# Patient Record
Sex: MALE | Race: WHITE | NOT HISPANIC OR LATINO | Employment: FULL TIME | URBAN - METROPOLITAN AREA
[De-identification: names, ages, dates, MRNs, and addresses within clinical notes are randomized per-mention and may not be internally consistent; named-entity substitution may affect disease eponyms.]

---

## 2017-08-01 ENCOUNTER — TELEPHONE (OUTPATIENT)
Dept: FAMILY MEDICINE CLINIC | Facility: CLINIC | Age: 52
End: 2017-08-01

## 2017-08-01 RX ORDER — OMEPRAZOLE 40 MG/1
40 CAPSULE, DELAYED RELEASE ORAL DAILY
Qty: 30 CAPSULE | Refills: 0 | Status: SHIPPED | OUTPATIENT
Start: 2017-08-01 | End: 2018-09-21 | Stop reason: SDUPTHER

## 2017-08-01 NOTE — TELEPHONE ENCOUNTER
----- Message from Katrin Knutson MA sent at 7/31/2017  5:27 PM EDT -----  Regarding: refill  Contact: [phone]  Needs refill on omeprazole dr 40 mg cap send to [address].

## 2017-09-07 ENCOUNTER — APPOINTMENT (OUTPATIENT)
Dept: GENERAL RADIOLOGY | Facility: HOSPITAL | Age: 52
End: 2017-09-07

## 2017-09-07 ENCOUNTER — HOSPITAL ENCOUNTER (EMERGENCY)
Facility: HOSPITAL | Age: 52
Discharge: HOME | End: 2017-09-07
Attending: EMERGENCY MEDICINE | Admitting: EMERGENCY MEDICINE

## 2017-09-07 VITALS
DIASTOLIC BLOOD PRESSURE: 89 MMHG | TEMPERATURE: 98.4 F | HEART RATE: 62 BPM | RESPIRATION RATE: 18 BRPM | BODY MASS INDEX: 24.73 KG/M2 | HEIGHT: 69 IN | OXYGEN SATURATION: 95 % | SYSTOLIC BLOOD PRESSURE: 120 MMHG | WEIGHT: 167 LBS

## 2017-09-07 DIAGNOSIS — R29.810 FACIAL DROOP: ICD-10-CM

## 2017-09-07 DIAGNOSIS — R20.2 FACIAL PARESTHESIA: ICD-10-CM

## 2017-09-07 DIAGNOSIS — G51.0 BELL'S PALSY: Primary | ICD-10-CM

## 2017-09-07 LAB
ALT SERPL W P-5'-P-CCNC: 23 U/L (ref 7–40)
APTT PPP: 26.8 SECONDS (ref 24–31)
AST SERPL-CCNC: 22 U/L (ref 0–33)
BASOPHILS # BLD AUTO: 0.03 10*3/MM3 (ref 0–0.2)
BASOPHILS NFR BLD AUTO: 0.4 % (ref 0–1)
BUN BLDA-MCNC: 12 MG/DL (ref 8–26)
CA-I BLDA-SCNC: 1.24 MMOL/L (ref 1.2–1.32)
CHLORIDE BLDA-SCNC: 104 MMOL/L (ref 98–109)
CO2 BLDA-SCNC: 26 MMOL/L (ref 24–29)
CREAT BLDA-MCNC: 0.8 MG/DL (ref 0.6–1.3)
DEPRECATED RDW RBC AUTO: 43.5 FL (ref 37–54)
EOSINOPHIL # BLD AUTO: 0.09 10*3/MM3 (ref 0–0.3)
EOSINOPHIL NFR BLD AUTO: 1.2 % (ref 0–3)
ERYTHROCYTE [DISTWIDTH] IN BLOOD BY AUTOMATED COUNT: 12.9 % (ref 11.3–14.5)
GLUCOSE BLDC GLUCOMTR-MCNC: 82 MG/DL (ref 70–130)
HCT VFR BLD AUTO: 46.8 % (ref 38.9–50.9)
HCT VFR BLDA CALC: 49 % (ref 38–51)
HGB BLD-MCNC: 16.1 G/DL (ref 13.1–17.5)
HGB BLDA-MCNC: 16.7 G/DL (ref 12–17)
HOLD SPECIMEN: NORMAL
HOLD SPECIMEN: NORMAL
IMM GRANULOCYTES # BLD: 0.02 10*3/MM3 (ref 0–0.03)
IMM GRANULOCYTES NFR BLD: 0.3 % (ref 0–0.6)
INR PPP: 1 (ref 0.8–1.2)
LYMPHOCYTES # BLD AUTO: 1.99 10*3/MM3 (ref 0.6–4.8)
LYMPHOCYTES NFR BLD AUTO: 26 % (ref 24–44)
MCH RBC QN AUTO: 31.6 PG (ref 27–31)
MCHC RBC AUTO-ENTMCNC: 34.4 G/DL (ref 32–36)
MCV RBC AUTO: 91.8 FL (ref 80–99)
MONOCYTES # BLD AUTO: 0.44 10*3/MM3 (ref 0–1)
MONOCYTES NFR BLD AUTO: 5.8 % (ref 0–12)
NEUTROPHILS # BLD AUTO: 5.08 10*3/MM3 (ref 1.5–8.3)
NEUTROPHILS NFR BLD AUTO: 66.3 % (ref 41–71)
PLATELET # BLD AUTO: 244 10*3/MM3 (ref 150–450)
PMV BLD AUTO: 13.1 FL (ref 6–12)
POTASSIUM BLDA-SCNC: 3.7 MMOL/L (ref 3.5–4.9)
PROTHROMBIN TIME: 11.8 SECONDS (ref 12.8–15.2)
RBC # BLD AUTO: 5.1 10*6/MM3 (ref 4.2–5.76)
SODIUM BLDA-SCNC: 143 MMOL/L (ref 138–146)
TROPONIN I SERPL-MCNC: 0 NG/ML (ref 0–0.07)
WBC NRBC COR # BLD: 7.65 10*3/MM3 (ref 3.5–10.8)
WHOLE BLOOD HOLD SPECIMEN: NORMAL
WHOLE BLOOD HOLD SPECIMEN: NORMAL

## 2017-09-07 PROCEDURE — 85014 HEMATOCRIT: CPT

## 2017-09-07 PROCEDURE — 80047 BASIC METABLC PNL IONIZED CA: CPT

## 2017-09-07 PROCEDURE — 84460 ALANINE AMINO (ALT) (SGPT): CPT | Performed by: EMERGENCY MEDICINE

## 2017-09-07 PROCEDURE — 99284 EMERGENCY DEPT VISIT MOD MDM: CPT

## 2017-09-07 PROCEDURE — 71010 HC CHEST PA OR AP: CPT

## 2017-09-07 PROCEDURE — 84450 TRANSFERASE (AST) (SGOT): CPT | Performed by: EMERGENCY MEDICINE

## 2017-09-07 PROCEDURE — 85610 PROTHROMBIN TIME: CPT

## 2017-09-07 PROCEDURE — 93005 ELECTROCARDIOGRAM TRACING: CPT | Performed by: EMERGENCY MEDICINE

## 2017-09-07 PROCEDURE — 84484 ASSAY OF TROPONIN QUANT: CPT

## 2017-09-07 PROCEDURE — 85730 THROMBOPLASTIN TIME PARTIAL: CPT | Performed by: EMERGENCY MEDICINE

## 2017-09-07 PROCEDURE — 85025 COMPLETE CBC W/AUTO DIFF WBC: CPT | Performed by: EMERGENCY MEDICINE

## 2017-09-07 RX ORDER — SODIUM CHLORIDE 0.9 % (FLUSH) 0.9 %
10 SYRINGE (ML) INJECTION AS NEEDED
Status: DISCONTINUED | OUTPATIENT
Start: 2017-09-07 | End: 2017-09-07 | Stop reason: HOSPADM

## 2017-09-07 RX ORDER — ACYCLOVIR 400 MG/1
400 TABLET ORAL
Qty: 35 TABLET | Refills: 0 | Status: SHIPPED | OUTPATIENT
Start: 2017-09-07 | End: 2017-09-14

## 2017-09-07 RX ORDER — PREDNISONE 20 MG/1
TABLET ORAL
Qty: 20 TABLET | Refills: 0 | Status: SHIPPED | OUTPATIENT
Start: 2017-09-07 | End: 2017-10-02

## 2017-09-07 NOTE — DISCHARGE INSTRUCTIONS
Follow up with Dr. Hobbs on Monday. Call for appointment.  Use Lacri-Lube on the right eye as needed, over-the-counter.    Follow up with Dr. Man, ENT early next week. Call for appointment.     Immediately return to the ED for any concerns or worsening symptoms.     You may return to work on 9/12/17 if you are cleared by Dr. Hobbs during your appointment on Monday.     Please review the medications you are supposed to be taking according to prior physician recommendations. I have not changed your home medications during this visit. If your discharge instructions indicate that I have changed your home medications, this is not the case, and you should disregard. If you have any questions about the medication you should be taking at home, please call your physician.

## 2017-09-07 NOTE — ED PROVIDER NOTES
Subjective   History of Present Illness    Review of Systems    Past Medical History:   Diagnosis Date   • Anxiety    • Chronic reflux esophagitis    • Diverticulosis    • Hiatal hernia    • Nicotine dependence        No Known Allergies    Past Surgical History:   Procedure Laterality Date   • INGUINAL HERNIA REPAIR     • LAPAROSCOPY ESOPHAGOGASTRIC FUNDOPLASTY HYBRID     • NEUROPLASTY / TRANSPOSITION ULNAR NERVE AT ELBOW     • UPPER GASTROINTESTINAL ENDOSCOPY         Family History   Problem Relation Age of Onset   • Diabetes Mother    • Cancer Father      lung       Social History     Social History   • Marital status:      Spouse name: N/A   • Number of children: N/A   • Years of education: N/A     Social History Main Topics   • Smoking status: Current Every Day Smoker   • Smokeless tobacco: Not on file   • Alcohol use Not on file   • Drug use: Not on file   • Sexual activity: Not on file     Other Topics Concern   • Not on file     Social History Narrative           Objective   Physical Exam    Procedures         ED Course  ED Course   Comment By Time   I was called to the patient's bedside to evaluate the patient.  I have done so.  I may end up seeing the patient from a primary standpoint, but that remains to be seen.  I do not believe the patient requires code 19 status, as the symptoms started 17+ hours ago.  My current differential diagnosis has Bell's palsy high on the list. Terence Ramirez MD 09/07 1020   I had a very long and detailed conversation with the patient and his wife about the expected course of care and the results of his studies.  The patient's PTT, liver enzymes, point of care and chem 8, and CBC are unremarkable.  The patient has right-sided facial numbness, loss of taste on the right side of his tongue, difficulty closing his right eyelid, and loss of motor use of the right forehead.  After multiple questions in a relatively long history of present illness process, the patient  also has noted that he's been having some inner ear fullness in the right ear over the last couple days as well.  The rest of his neurologic exam is completely unremarkable.  I believe Bell's palsy to be a very likely diagnosis.  I talked to the patient about the treatment, the chance that the symptoms may continue, and the potential need for ENT follow-up.  We have referred him to her ENT physician, Dr. Man.  We have also referred him to PCP.  I like him to follow up with his primary care physician tomorrow or Monday and with ENT early next week as well.  We have talked about the potential need for intra-articular steroids or even nerve unroofing, should symptoms continue.  I talked about the risks and benefits of not doing a CT scan or MRI.  Patient would like to forego these studies.  If symptoms get worse, he will come back.  Patient is very appreciative for care without question or complaint.  Patient will be diagnosed with Bell's palsy, right-sided facial paresthesia, right-sided facial weakness.  Follow up with PCP and ENT.  Medications as prescribed.  Return here for new or changing concerns. Terence Ramirez MD 09/07 1124                  [facility]    Final diagnoses:   Bell's palsy   Facial paresthesia

## 2017-09-07 NOTE — ED PROVIDER NOTES
Subjective   HPI Comments: This patient is a very nice 52-year-old gentleman who works at the [employer] here as a .  The patient states that yesterday about 5:00, he lost taste in the right side of his tongue and had sudden onset of facial droop and difficulty closing his right eyelid.  He also had numbness throughout the right side of his face.  He tells me that over the last couple of days, he has noticed a fullness in the right ear.  He has no history of Bell's palsy no history of stroke.  His mom, incidentally, has a history of Bell's palsy.  Patient reports that the symptoms started yesterday at 5 PM.  He drove himself to work today but continued to have numbness and decided to come in to get checked out.  I was called to the bedside to determine potential for code 19 status.  Getting a history, Bell's palsy see much more likely based on symptoms that.  Patient does not have hyperacusis but does have the aforementioned fullness in the ear.  He is also had some mild dizziness.  Coincidentally, the patient had some congestion and viral-like syndrome through the weekend and early this week.  He is accompanied here by his wife who confirms the aforementioned story.  In summary, this is a 52-year-old gentleman with sudden onset of difficulty closing the right eyelid, loss of taste right side of his tongue last night and numbness about the right face who comes in today for evaluation.  He denies chest pain, shortness of breath, cough, fevers, chills, neck pain or any other concerns.    Past Medical History: GERD, Diverticulosis. No history of CVA.    Past Family History: Mother has history of Bell's palsy. No history of CVA.     Patient is a 52 y.o. male presenting with general illness.   History provided by:  Patient  Illness   Location:  Right facial   Quality:  Droop and numbness  Severity:  Moderate  Onset quality:  Sudden  Duration:  17 hours  Timing:  Constant  Progression:  Unchanged  Chronicity:   New  Context:  Reports experiencing allergy symptoms last week. He suddenly developed right facial droop with numbness approximately 17 hours ago. Loss of taste and ear pain associated.   Relieved by:  None tried  Worsened by:  Nothing  Ineffective treatments:  None tried  Associated symptoms: congestion (recent congestion last week. ) and ear pain (inner ear fullness sensation )    Associated symptoms: no chest pain, no diarrhea, no fatigue, no fever, no headaches, no myalgias, no nausea, no shortness of breath and no vomiting    Associated symptoms comment:  Recent allergy symptoms.       Review of Systems   Constitutional: Negative for chills, fatigue, fever and unexpected weight change.   HENT: Positive for congestion (recent congestion last week. ) and ear pain (inner ear fullness sensation ). Negative for dental problem, hearing loss and sinus pressure.         Loss of taste at the right tongue.   Eyes: Negative for pain and visual disturbance.        Difficulty closing right eye.    Respiratory: Negative for chest tightness and shortness of breath.    Cardiovascular: Negative for chest pain, palpitations and leg swelling.   Gastrointestinal: Negative for diarrhea, nausea, rectal pain and vomiting.   Genitourinary: Negative for difficulty urinating, dysuria, frequency, hematuria and urgency.   Musculoskeletal: Negative for myalgias, neck pain and neck stiffness.   Neurological: Positive for facial asymmetry (right sided), weakness (Right facial weakness) and numbness (right facial). Negative for seizures, syncope, speech difficulty, light-headedness and headaches.   Psychiatric/Behavioral: Negative for confusion.   All other systems reviewed and are negative.      Past Medical History:   Diagnosis Date   • Anxiety    • Chronic reflux esophagitis    • Diverticulosis    • Hiatal hernia    • Nicotine dependence        No Known Allergies    Past Surgical History:   Procedure Laterality Date   • INGUINAL HERNIA  REPAIR     • LAPAROSCOPY ESOPHAGOGASTRIC FUNDOPLASTY HYBRID     • NEUROPLASTY / TRANSPOSITION ULNAR NERVE AT ELBOW     • UPPER GASTROINTESTINAL ENDOSCOPY         Family History   Problem Relation Age of Onset   • Diabetes Mother    • Cancer Father      lung       Social History     Social History   • Marital status:      Spouse name: N/A   • Number of children: N/A   • Years of education: N/A     Social History Main Topics   • Smoking status: Current Every Day Smoker   • Smokeless tobacco: None   • Alcohol use None   • Drug use: None   • Sexual activity: Not Asked     Other Topics Concern   • None     Social History Narrative    Lives with his wife         Objective   Physical Exam   Constitutional: He is oriented to person, place, and time. He appears well-developed.  Non-toxic appearance. No distress.   HENT:   Right Ear: Tympanic membrane, external ear and ear canal normal.   Left Ear: Tympanic membrane, external ear and ear canal normal.   Nose: Nose normal. No nasal septal hematoma.   Mouth/Throat: Oropharynx is clear and moist. Mucous membranes are not dry. No oral lesions. No trismus in the jaw. No dental abscesses or uvula swelling. No posterior oropharyngeal erythema or tonsillar abscesses. No tonsillar exudate.   Right facial droop.    Eyes: EOM are normal. Pupils are equal, round, and reactive to light. Right conjunctiva is not injected. Left conjunctiva is not injected.   Neck: Normal range of motion. Neck supple. No JVD present. No tracheal tenderness present. No rigidity. Normal range of motion present.   Cardiovascular: Normal rate, regular rhythm and normal heart sounds.  Exam reveals no gallop and no friction rub.    Pulmonary/Chest: Effort normal and breath sounds normal. He has no wheezes. He has no rales. He exhibits no tenderness.   Abdominal: Soft. Bowel sounds are normal. He exhibits no distension, no pulsatile midline mass and no mass. There is no tenderness. There is no rigidity, no  rebound, no guarding and no tenderness at McBurney's point.   No signs of acute abdomen.  No pain at McBurney's point.  No pulsatile abdominal mass     Musculoskeletal: Normal range of motion. He exhibits no edema, tenderness or deformity.   Lymphadenopathy:     He has no cervical adenopathy.   Neurological: He is alert and oriented to person, place, and time. He has normal strength. He displays no tremor. He exhibits normal muscle tone.   5/5 strength bilaterally with flexion and extension of fingers, wrist, elbows, knees and hips as well as plantar and dorsiflexion of the foot. Normal finger to nose testing. Right facial droop. Loss of motor use of the right forehead. Difficulty closing right eye. Otherwise unremarkable neurologic examination.      Skin: Skin is warm and dry. No rash noted. No erythema.   Psychiatric: He has a normal mood and affect. His speech is normal and behavior is normal. Judgment and thought content normal. He is attentive.   Nursing note and vitals reviewed.      Procedures         ED Course  ED Course   Comment By Time   I was called to the patient's bedside to evaluate the patient.  I have done so.  I may end up seeing the patient from a primary standpoint, but that remains to be seen.  I do not believe the patient requires code 19 status, as the symptoms started 17+ hours ago.  My current differential diagnosis has Bell's palsy high on the list. Terence Ramirez MD 09/07 0492   I had a very long and detailed conversation with the patient and his wife about the expected course of care and the results of his studies.  The patient's PTT, liver enzymes, point of care and chem 8, and CBC are unremarkable.  The patient has right-sided facial numbness, loss of taste on the right side of his tongue, difficulty closing his right eyelid, and loss of motor use of the right forehead.  After multiple questions in a relatively long history of present illness process, the patient also has noted that  he's been having some inner ear fullness in the right ear over the last couple days as well.  The rest of his neurologic exam is completely unremarkable.  I believe Bell's palsy to be a very likely diagnosis.  I talked to the patient about the treatment, the chance that the symptoms may continue, and the potential need for ENT follow-up.  We have referred him to her ENT physician, Dr. Man.  We have also referred him to PCP.  I like him to follow up with his primary care physician tomorrow or Monday and with ENT early next week as well.  We have talked about the potential need for intra-articular steroids or even nerve unroofing, should symptoms continue.  I talked about the risks and benefits of not doing a CT scan or MRI.  Patient would like to forego these studies.  If symptoms get worse, he will come back.  Patient is very appreciative for care without question or complaint.  Patient will be diagnosed with Bell's palsy, right-sided facial paresthesia, right-sided facial weakness.  Follow up with PCP and ENT.  Medications as prescribed.  Return here for new or changing concerns. Terence Ramirez MD 09/07 1124     Recent Results (from the past 24 hour(s))   aPTT    Collection Time: 09/07/17 10:26 AM   Result Value Ref Range    PTT 26.8 24.0 - 31.0 seconds   AST    Collection Time: 09/07/17 10:26 AM   Result Value Ref Range    AST (SGOT) 22 0 - 33 U/L   ALT    Collection Time: 09/07/17 10:26 AM   Result Value Ref Range    ALT (SGPT) 23 7 - 40 U/L   Light Blue Top    Collection Time: 09/07/17 10:26 AM   Result Value Ref Range    Extra Tube hold for add-on    Green Top (Gel)    Collection Time: 09/07/17 10:26 AM   Result Value Ref Range    Extra Tube Hold for add-ons.    Lavender Top    Collection Time: 09/07/17 10:26 AM   Result Value Ref Range    Extra Tube hold for add-on    Gold Top - SST    Collection Time: 09/07/17 10:26 AM   Result Value Ref Range    Extra Tube Hold for add-ons.    CBC Auto Differential     Collection Time: 09/07/17 10:26 AM   Result Value Ref Range    WBC 7.65 3.50 - 10.80 10*3/mm3    RBC 5.10 4.20 - 5.76 10*6/mm3    Hemoglobin 16.1 13.1 - 17.5 g/dL    Hematocrit 46.8 38.9 - 50.9 %    MCV 91.8 80.0 - 99.0 fL    MCH 31.6 (H) 27.0 - 31.0 pg    MCHC 34.4 32.0 - 36.0 g/dL    RDW 12.9 11.3 - 14.5 %    RDW-SD 43.5 37.0 - 54.0 fl    MPV 13.1 (H) 6.0 - 12.0 fL    Platelets 244 150 - 450 10*3/mm3    Neutrophil % 66.3 41.0 - 71.0 %    Lymphocyte % 26.0 24.0 - 44.0 %    Monocyte % 5.8 0.0 - 12.0 %    Eosinophil % 1.2 0.0 - 3.0 %    Basophil % 0.4 0.0 - 1.0 %    Immature Grans % 0.3 0.0 - 0.6 %    Neutrophils, Absolute 5.08 1.50 - 8.30 10*3/mm3    Lymphocytes, Absolute 1.99 0.60 - 4.80 10*3/mm3    Monocytes, Absolute 0.44 0.00 - 1.00 10*3/mm3    Eosinophils, Absolute 0.09 0.00 - 0.30 10*3/mm3    Basophils, Absolute 0.03 0.00 - 0.20 10*3/mm3    Immature Grans, Absolute 0.02 0.00 - 0.03 10*3/mm3   POC Protime / INR    Collection Time: 09/07/17 10:43 AM   Result Value Ref Range    Protime 11.8 (L) 12.8 - 15.2 seconds    INR 1.0 0.8 - 1.2   POC CHEM 8    Collection Time: 09/07/17 10:44 AM   Result Value Ref Range    Glucose 82 70 - 130 mg/dL    BUN, Arterial 12 8 - 26 mg/dL    Creatinine 0.80 0.60 - 1.30 mg/dL    Sodium 143 138 - 146 mmol/L    Potassium 3.7 3.5 - 4.9 mmol/L    Chloride 104 98 - 109 mmol/L    Total CO2 26 24 - 29 mmol/L    Hemoglobin 16.7 12.0 - 17.0 g/dL    Hematocrit 49 38 - 51 %    Ionized Calcium 1.24 1.20 - 1.32 mmol/L   POC Troponin, Rapid    Collection Time: 09/07/17 10:57 AM   Result Value Ref Range    Troponin I 0.00 0.00 - 0.07 ng/mL     Note: In addition to lab results from this visit, the labs listed above may include labs taken at another facility or during a different encounter within the last 24 hours. Please correlate lab times with ED admission and discharge times for further clarification of the services performed during this visit.    XR Chest 1 View   Final Result   Negative  "chest, no active disease. Mild chronic scarring   diffusely.       D:  09/07/2017   E:  09/07/2017       This report was finalized on 9/7/2017 11:37 AM by Dr. Eliel Chavira MD.            Vitals:    09/07/17 1026 09/07/17 1027 09/07/17 1100 09/07/17 1200   BP:  (!) 166/114 128/83 120/89   BP Location:  Right arm  Right arm   Patient Position:  Lying  Lying   Pulse:  88 80 62   Resp:  18  18   Temp:  98.4 °F (36.9 °C)     TempSrc:  Oral     SpO2:  97% 96% 95%   Weight: 167 lb (75.8 kg)      Height: 69\" (175.3 cm)        Medications - No data to display  ECG/EMG Results (last 24 hours)     Procedure Component Value Units Date/Time    ECG 12 Lead [[id number]] Collected:  09/07/17 1043     Updated:  09/07/17 1056                        MDM    Final diagnoses:   Bell's palsy   Facial paresthesia   Facial droop       Documentation assistance provided by mian Marvin.  Information recorded by the mian was done at my direction and has been verified and validated by me.     Francesca Marvin  09/07/17 1126       Francesca Marvin  09/07/17 1133       Terence Ramirez MD  09/07/17 1606    "

## 2017-09-08 ENCOUNTER — OFFICE VISIT (OUTPATIENT)
Dept: FAMILY MEDICINE CLINIC | Facility: CLINIC | Age: 52
End: 2017-09-08

## 2017-09-08 VITALS
HEART RATE: 94 BPM | SYSTOLIC BLOOD PRESSURE: 122 MMHG | WEIGHT: 173 LBS | HEIGHT: 69 IN | BODY MASS INDEX: 25.62 KG/M2 | DIASTOLIC BLOOD PRESSURE: 86 MMHG | RESPIRATION RATE: 16 BRPM | OXYGEN SATURATION: 98 %

## 2017-09-08 DIAGNOSIS — G51.0 BELL'S PALSY: Primary | ICD-10-CM

## 2017-09-08 PROCEDURE — 99213 OFFICE O/P EST LOW 20 MIN: CPT | Performed by: FAMILY MEDICINE

## 2017-09-08 NOTE — PROGRESS NOTES
"Leonor Valencia is a 52 y.o. male.     History of Present Illness   He reports that on Wednesday he started experiencing \"unusual\" sensation to his right face.  That evening he reports difficulty with tasting his food but no difficulty with mastication or swallowing.  The following day the right side of his face started to droop but he experienced no difficulty with confusion, headaches, speech or upper/lower extremity weakness.  He presented to the [facility] ED around lunch time yesterday for evaluation.  He was diagnosed with Bell's Palsy and dismissed on a steroid taper and anti-viral medication.  He is tolerating both medications well with no adverse events.  He denies progression of symptoms.  He reports no associated weakness to his extremities.  He denies confusion, mental status changes, seizures, headache, hearing loss or skin eruptions.  There have been no gait disturbances or lack of coordination.    Review of Systems   Constitutional: Negative for chills and fever.   HENT: Positive for congestion. Negative for facial swelling, hearing loss, nosebleeds and trouble swallowing.    Eyes: Negative for visual disturbance.   Respiratory: Negative for cough and shortness of breath.    Cardiovascular: Negative for chest pain, palpitations and leg swelling.   Gastrointestinal: Negative for abdominal pain, diarrhea, nausea and vomiting.   Genitourinary: Negative for difficulty urinating.   Musculoskeletal: Negative for arthralgias.   Skin: Negative for rash.   Neurological: Positive for facial asymmetry. Negative for dizziness, tremors, seizures, syncope, speech difficulty, light-headedness and headaches.   Psychiatric/Behavioral: The patient is not nervous/anxious.        Objective   Physical Exam   Constitutional: He is oriented to person, place, and time. He appears well-developed and well-nourished.   HENT:   Head: Normocephalic and atraumatic.   Eyes: Conjunctivae are normal.   Neck: Neck supple. "   Cardiovascular: Normal rate, regular rhythm and normal heart sounds.    Pulmonary/Chest: Effort normal and breath sounds normal.   Musculoskeletal: Normal range of motion.   Neurological: He is alert and oriented to person, place, and time. He has normal strength and normal reflexes. A cranial nerve deficit is present. No sensory deficit. Coordination and gait normal.   Right sided facial nerve paresis with sensory intact to right face   Skin: Skin is warm and dry. No rash noted.   No facial skin eruptions or vesicles   Psychiatric: His speech is normal and behavior is normal. Judgment and thought content normal. His mood appears anxious. Cognition and memory are normal.   Nursing note and vitals reviewed.    I have personally reviewed and interpreted available lab data dated 09/07/2017.  I have personally reviewed chest x-ray report available and dated 09/07/2017.  I have personally reviewed ECG report dated 09/07/2017.   I have personally reviewed and summarized [facility] ED records.    Assessment/Plan   Diagnoses and all orders for this visit:    Bell's palsy (I agree with ED assessment)  --  Finish prednisone taper  --  Continue with Acyclovir  --  Tape right eye shut at night  --  Patient education via the Internet  --  RTC fasting in 2 to 4 weeks for re-evaluation and annual fasting wellness evaluation.

## 2017-10-02 ENCOUNTER — OFFICE VISIT (OUTPATIENT)
Dept: FAMILY MEDICINE CLINIC | Facility: CLINIC | Age: 52
End: 2017-10-02

## 2017-10-02 VITALS
OXYGEN SATURATION: 97 % | BODY MASS INDEX: 26.8 KG/M2 | HEIGHT: 68 IN | RESPIRATION RATE: 16 BRPM | SYSTOLIC BLOOD PRESSURE: 122 MMHG | DIASTOLIC BLOOD PRESSURE: 84 MMHG | HEART RATE: 88 BPM | WEIGHT: 176.8 LBS

## 2017-10-02 DIAGNOSIS — Z00.00 HEALTHCARE MAINTENANCE: Primary | ICD-10-CM

## 2017-10-02 DIAGNOSIS — Z87.891 PERSONAL HISTORY OF TOBACCO USE, PRESENTING HAZARDS TO HEALTH: ICD-10-CM

## 2017-10-02 LAB
25(OH)D3 SERPL-MCNC: 18.4 NG/ML
ALBUMIN SERPL-MCNC: 4.3 G/DL (ref 3.2–4.8)
ALBUMIN/GLOB SERPL: 1.9 G/DL (ref 1.5–2.5)
ALP SERPL-CCNC: 88 U/L (ref 25–100)
ALT SERPL W P-5'-P-CCNC: 19 U/L (ref 7–40)
ANION GAP SERPL CALCULATED.3IONS-SCNC: 4 MMOL/L (ref 3–11)
ARTICHOKE IGE QN: 163 MG/DL (ref 0–130)
AST SERPL-CCNC: 17 U/L (ref 0–33)
BASOPHILS # BLD AUTO: 0.02 10*3/MM3 (ref 0–0.2)
BASOPHILS NFR BLD AUTO: 0.4 % (ref 0–1)
BILIRUB BLD-MCNC: NEGATIVE MG/DL
BILIRUB SERPL-MCNC: 0.4 MG/DL (ref 0.3–1.2)
BUN BLD-MCNC: 12 MG/DL (ref 9–23)
BUN/CREAT SERPL: 13.3 (ref 7–25)
CALCIUM SPEC-SCNC: 9.6 MG/DL (ref 8.7–10.4)
CHLORIDE SERPL-SCNC: 112 MMOL/L (ref 99–109)
CHOLEST SERPL-MCNC: 199 MG/DL (ref 0–200)
CLARITY, POC: CLEAR
CO2 SERPL-SCNC: 28 MMOL/L (ref 20–31)
COLOR UR: YELLOW
CREAT BLD-MCNC: 0.9 MG/DL (ref 0.6–1.3)
CRP SERPL-MCNC: 0.02 MG/DL (ref 0–1)
DEPRECATED RDW RBC AUTO: 48.1 FL (ref 37–54)
EOSINOPHIL # BLD AUTO: 0.16 10*3/MM3 (ref 0–0.3)
EOSINOPHIL NFR BLD AUTO: 2.9 % (ref 0–3)
ERYTHROCYTE [DISTWIDTH] IN BLOOD BY AUTOMATED COUNT: 13.6 % (ref 11.3–14.5)
GFR SERPL CREATININE-BSD FRML MDRD: 89 ML/MIN/1.73
GLOBULIN UR ELPH-MCNC: 2.3 GM/DL
GLUCOSE BLD-MCNC: 100 MG/DL (ref 70–100)
GLUCOSE UR STRIP-MCNC: NEGATIVE MG/DL
HBA1C MFR BLD: 5.5 % (ref 4.8–5.6)
HCT VFR BLD AUTO: 46 % (ref 38.9–50.9)
HCV AB SER DONR QL: NORMAL
HDLC SERPL-MCNC: 47 MG/DL (ref 40–60)
HGB BLD-MCNC: 15.2 G/DL (ref 13.1–17.5)
HIV1+2 AB SER QL: NORMAL
IMM GRANULOCYTES # BLD: 0.01 10*3/MM3 (ref 0–0.03)
IMM GRANULOCYTES NFR BLD: 0.2 % (ref 0–0.6)
KETONES UR QL: NEGATIVE
LEUKOCYTE EST, POC: NEGATIVE
LYMPHOCYTES # BLD AUTO: 1.74 10*3/MM3 (ref 0.6–4.8)
LYMPHOCYTES NFR BLD AUTO: 31.8 % (ref 24–44)
MCH RBC QN AUTO: 31.7 PG (ref 27–31)
MCHC RBC AUTO-ENTMCNC: 33 G/DL (ref 32–36)
MCV RBC AUTO: 95.8 FL (ref 80–99)
MONOCYTES # BLD AUTO: 0.41 10*3/MM3 (ref 0–1)
MONOCYTES NFR BLD AUTO: 7.5 % (ref 0–12)
NEUTROPHILS # BLD AUTO: 3.14 10*3/MM3 (ref 1.5–8.3)
NEUTROPHILS NFR BLD AUTO: 57.2 % (ref 41–71)
NITRITE UR-MCNC: NEGATIVE MG/ML
PH UR: 7 [PH] (ref 5–8)
PLATELET # BLD AUTO: 208 10*3/MM3 (ref 150–450)
PMV BLD AUTO: 13.5 FL (ref 6–12)
POTASSIUM BLD-SCNC: 4.2 MMOL/L (ref 3.5–5.5)
PROT SERPL-MCNC: 6.6 G/DL (ref 5.7–8.2)
PROT UR STRIP-MCNC: NEGATIVE MG/DL
PSA SERPL-MCNC: 1.17 NG/ML (ref 0–4)
RBC # BLD AUTO: 4.8 10*6/MM3 (ref 4.2–5.76)
RBC # UR STRIP: NEGATIVE /UL
SODIUM BLD-SCNC: 144 MMOL/L (ref 132–146)
SP GR UR: 1.02 (ref 1–1.03)
TRIGL SERPL-MCNC: 83 MG/DL (ref 0–150)
TSH SERPL DL<=0.05 MIU/L-ACNC: 1.57 MIU/ML (ref 0.35–5.35)
URATE SERPL-MCNC: 5.1 MG/DL (ref 3.7–9.2)
UROBILINOGEN UR QL: NORMAL
WBC NRBC COR # BLD: 5.48 10*3/MM3 (ref 3.5–10.8)

## 2017-10-02 PROCEDURE — 82306 VITAMIN D 25 HYDROXY: CPT | Performed by: FAMILY MEDICINE

## 2017-10-02 PROCEDURE — 80061 LIPID PANEL: CPT | Performed by: FAMILY MEDICINE

## 2017-10-02 PROCEDURE — 36415 COLL VENOUS BLD VENIPUNCTURE: CPT | Performed by: FAMILY MEDICINE

## 2017-10-02 PROCEDURE — 99406 BEHAV CHNG SMOKING 3-10 MIN: CPT | Performed by: FAMILY MEDICINE

## 2017-10-02 PROCEDURE — 84550 ASSAY OF BLOOD/URIC ACID: CPT | Performed by: FAMILY MEDICINE

## 2017-10-02 PROCEDURE — 84443 ASSAY THYROID STIM HORMONE: CPT | Performed by: FAMILY MEDICINE

## 2017-10-02 PROCEDURE — 84153 ASSAY OF PSA TOTAL: CPT | Performed by: FAMILY MEDICINE

## 2017-10-02 PROCEDURE — 80053 COMPREHEN METABOLIC PANEL: CPT | Performed by: FAMILY MEDICINE

## 2017-10-02 PROCEDURE — 81003 URINALYSIS AUTO W/O SCOPE: CPT | Performed by: FAMILY MEDICINE

## 2017-10-02 PROCEDURE — 86140 C-REACTIVE PROTEIN: CPT | Performed by: FAMILY MEDICINE

## 2017-10-02 PROCEDURE — G0432 EIA HIV-1/HIV-2 SCREEN: HCPCS | Performed by: FAMILY MEDICINE

## 2017-10-02 PROCEDURE — 83036 HEMOGLOBIN GLYCOSYLATED A1C: CPT | Performed by: FAMILY MEDICINE

## 2017-10-02 PROCEDURE — 99396 PREV VISIT EST AGE 40-64: CPT | Performed by: FAMILY MEDICINE

## 2017-10-02 PROCEDURE — 86803 HEPATITIS C AB TEST: CPT | Performed by: FAMILY MEDICINE

## 2017-10-02 PROCEDURE — 85025 COMPLETE CBC W/AUTO DIFF WBC: CPT | Performed by: FAMILY MEDICINE

## 2017-10-02 RX ORDER — NICOTINE 21 MG/24HR
1 PATCH, TRANSDERMAL 24 HOURS TRANSDERMAL EVERY 24 HOURS
Start: 2017-10-02 | End: 2018-04-03

## 2017-10-02 NOTE — PROGRESS NOTES
Subjective   Gerhard Valencia is a 52 y.o. male.     History of Present Illness   He is here for his annual fasting wellness evaluation.  He declines immunization update today including the annual flu shot and Prevnar.  He voices no acute symptoms.  He has no plans to discontinue smoking at the present time.    Review of Systems   Constitutional: Negative.    HENT: Negative.    Eyes: Negative.    Respiratory: Positive for cough (attributed to smoking).    Cardiovascular: Negative.    Gastrointestinal: Negative.    Endocrine: Negative.    Genitourinary: Negative.    Musculoskeletal: Negative.    Skin: Negative.    Allergic/Immunologic: Negative.    Neurological: Negative.    Hematological: Negative.    Psychiatric/Behavioral: Negative.        Objective   Physical Exam   Constitutional: He is oriented to person, place, and time. He appears well-developed and well-nourished. He is cooperative.   HENT:   Head: Normocephalic and atraumatic.   Right Ear: Hearing and external ear normal.   Left Ear: Hearing and external ear normal.   Nose: Nose normal.   Mouth/Throat: Uvula is midline, oropharynx is clear and moist and mucous membranes are normal.   Eyes: Conjunctivae and EOM are normal. Pupils are equal, round, and reactive to light. No scleral icterus.   Neck: Trachea normal and normal range of motion. Neck supple. No JVD present. Carotid bruit is not present. No thyromegaly present.   Cardiovascular: Normal rate, regular rhythm, normal heart sounds and intact distal pulses.    Pulmonary/Chest: Effort normal and breath sounds normal.   Abdominal: Soft. Bowel sounds are normal. There is no hepatosplenomegaly. There is no tenderness.   Musculoskeletal: Normal range of motion.   Lymphadenopathy:     He has no cervical adenopathy.   Neurological: He is alert and oriented to person, place, and time. He has normal strength and normal reflexes. No sensory deficit. Gait normal.   Skin: Skin is warm and dry.   Psychiatric: He has a  normal mood and affect. His speech is normal and behavior is normal. Judgment and thought content normal. Cognition and memory are normal.   Nursing note and vitals reviewed.      Assessment/Plan   Diagnoses and all orders for this visit:    Healthcare maintenance  -     POC Urinalysis Dipstick, Automated  -     Comprehensive Metabolic Panel  -     CBC & Differential  -     Lipid Panel  -     TSH  -     Uric Acid  -     C-reactive Protein  -     PSA  -     HIV-1 / O / 2 Ag / Antibody 4th Generation  -     Hepatitis C Antibody  -     Hemoglobin A1c  -     Vitamin D 25 Hydroxy    Personal history of tobacco use, presenting hazards to health  -     nicotine (EQ NICOTINE) 21 MG/24HR patch; Place 1 patch on the skin Daily.  -     Ambulatory Referral to Smoking Cessation Program  - I advised the patient of the risks in continuing to use tobacco, and I provided this patient with smoking cessation educational materials.  I also discussed how to quit smoking and the patient has expressed the willingness to quit.  During this visit, I spent 3-10 mintues counseling the patient regarding smoking cessation.  We discussed various smoking cessation programs including Ross Jose Elias, Commit to Quit and Kick It.  We discussed over the counter nicotine replacement products.  We discussed Chantix and associated advertisements.  We discussed the benefits of cessation.  We discussed the risks of ongoing use including but not limited to lung disease, cancer and death.  The patient is encouraged to discontinue smoking and treatment options have been discussed & offered.    The patient is here for a health maintenance visit.  Currently, the patient consumes a healthy diet and has an adequate exercise regimen. Screening lab work is ordered.  Immunizations are reported as current.  He declined the flu shot today.  Advice and education is given regarding nutrition, aerobic exercise, routine dental evaluations, routine eye exams, reproductive  health, cardiovascular risk reduction, sunscreen use, self skin examination (annual dermatology evaluations) and seat belt use (general overall safety).  Further recommendations after lab evaluation.  Annual wellness evaluations recommended.

## 2018-04-03 ENCOUNTER — OFFICE VISIT (OUTPATIENT)
Dept: FAMILY MEDICINE CLINIC | Facility: CLINIC | Age: 53
End: 2018-04-03

## 2018-04-03 VITALS
HEIGHT: 68 IN | SYSTOLIC BLOOD PRESSURE: 118 MMHG | OXYGEN SATURATION: 97 % | BODY MASS INDEX: 27.43 KG/M2 | WEIGHT: 181 LBS | HEART RATE: 71 BPM | RESPIRATION RATE: 16 BRPM | DIASTOLIC BLOOD PRESSURE: 80 MMHG

## 2018-04-03 DIAGNOSIS — M25.511 ACUTE PAIN OF RIGHT SHOULDER: Primary | ICD-10-CM

## 2018-04-03 PROCEDURE — 99213 OFFICE O/P EST LOW 20 MIN: CPT | Performed by: FAMILY MEDICINE

## 2018-04-03 RX ORDER — TIZANIDINE 4 MG/1
TABLET ORAL
Qty: 45 TABLET | Refills: 0 | Status: SHIPPED | OUTPATIENT
Start: 2018-04-03 | End: 2019-01-29

## 2018-04-03 RX ORDER — FLUOXETINE HYDROCHLORIDE 40 MG/1
40 CAPSULE ORAL DAILY
Refills: 1 | COMMUNITY
Start: 2018-03-18 | End: 2020-02-18

## 2018-04-03 RX ORDER — DICLOFENAC SODIUM 75 MG/1
75 TABLET, DELAYED RELEASE ORAL 2 TIMES DAILY
Qty: 60 TABLET | Refills: 0 | Status: SHIPPED | OUTPATIENT
Start: 2018-04-03 | End: 2019-01-29

## 2018-04-03 NOTE — PROGRESS NOTES
Subjective   Gerhard Valencia is a 52 y.o. male.     History of Present Illness   The patient describes right shoulder pain over several weeks.  There is no recalled injury or trauma.  The patient reports overuse with activities at work and at home.  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  The pain is characterized as sharp and tight.  It is also characterized as constant, dull, throbbing ache.   It is located to the bottom of the neck area and radiates to the shoulder and back of head causing right shoulder pain.  Head turning and prolong posture activities make it worse.  Rest, ice, and massage help.  There is no upper extremity numbness, tingling or loss of .  He describes chronic popping to his right shoulder.    Review of Systems   Gastrointestinal: Negative for nausea and vomiting.   Musculoskeletal: Positive for neck pain.   Skin: Negative for rash.   Neurological: Negative for dizziness, seizures, weakness and headaches.   Hematological: Does not bruise/bleed easily.   Psychiatric/Behavioral: Positive for sleep disturbance.       Objective   Physical Exam   Constitutional: He is oriented to person, place, and time. He appears well-developed and well-nourished. No distress.   HENT:   Head: Normocephalic and atraumatic.   Right Ear: Hearing normal.   Left Ear: Hearing normal.   Mouth/Throat: Mucous membranes are normal.   Eyes: Conjunctivae and EOM are normal. Pupils are equal, round, and reactive to light.   Neck: Trachea normal. No JVD present. Muscular tenderness present. No spinous process tenderness present. Decreased range of motion present. No edema and no erythema present. No thyromegaly present.   Cardiovascular: Normal rate, regular rhythm and normal heart sounds.    Pulmonary/Chest: Effort normal and breath sounds normal.   Musculoskeletal:        Right shoulder: He exhibits decreased range of motion (decreased abduction), tenderness, crepitus, pain and decreased strength. He exhibits no swelling, no  effusion, no deformity and no laceration.   Supraspinatus testing reveals weakness and reproduces pain.  Subscapularis testing identifies weakness.   Neurological: He is alert and oriented to person, place, and time. He has normal strength. No sensory deficit. Gait normal.   Skin: Skin is warm and dry. He is not diaphoretic.   Psychiatric: He has a normal mood and affect. His behavior is normal. Judgment and thought content normal. Cognition and memory are normal.   Nursing note and vitals reviewed.      Assessment/Plan   Diagnoses and all orders for this visit:    Acute pain of right shoulder  -     diclofenac (VOLTAREN) 75 MG EC tablet; Take 1 tablet by mouth 2 (Two) Times a Day.  -     tiZANidine (ZANAFLEX) 4 MG tablet; Take 1 tab po q hs  -     Ambulatory Referral to Orthopedic Surgery  - Avoid injury/protect  - Gentle massage therapy

## 2018-09-21 ENCOUNTER — OFFICE VISIT (OUTPATIENT)
Dept: FAMILY MEDICINE CLINIC | Facility: CLINIC | Age: 53
End: 2018-09-21

## 2018-09-21 VITALS
HEIGHT: 68 IN | WEIGHT: 179.6 LBS | SYSTOLIC BLOOD PRESSURE: 120 MMHG | RESPIRATION RATE: 16 BRPM | BODY MASS INDEX: 27.22 KG/M2 | HEART RATE: 67 BPM | DIASTOLIC BLOOD PRESSURE: 76 MMHG | OXYGEN SATURATION: 97 %

## 2018-09-21 DIAGNOSIS — K21.9 CHRONIC GERD: ICD-10-CM

## 2018-09-21 DIAGNOSIS — K21.9 CHRONIC GERD: Primary | ICD-10-CM

## 2018-09-21 DIAGNOSIS — R13.19 ESOPHAGEAL DYSPHAGIA: ICD-10-CM

## 2018-09-21 DIAGNOSIS — R14.3 FLATULENCE: ICD-10-CM

## 2018-09-21 LAB
ALBUMIN SERPL-MCNC: 4.57 G/DL (ref 3.2–4.8)
ALBUMIN/GLOB SERPL: 2.4 G/DL (ref 1.5–2.5)
ALP SERPL-CCNC: 85 U/L (ref 25–100)
ALT SERPL W P-5'-P-CCNC: 16 U/L (ref 7–40)
ANION GAP SERPL CALCULATED.3IONS-SCNC: 2 MMOL/L (ref 3–11)
AST SERPL-CCNC: 19 U/L (ref 0–33)
BASOPHILS # BLD AUTO: 0.03 10*3/MM3 (ref 0–0.2)
BASOPHILS NFR BLD AUTO: 0.4 % (ref 0–1)
BILIRUB SERPL-MCNC: 0.5 MG/DL (ref 0.3–1.2)
BUN BLD-MCNC: 12 MG/DL (ref 9–23)
BUN/CREAT SERPL: 12.4 (ref 7–25)
CALCIUM SPEC-SCNC: 9.8 MG/DL (ref 8.7–10.4)
CHLORIDE SERPL-SCNC: 110 MMOL/L (ref 99–109)
CO2 SERPL-SCNC: 28 MMOL/L (ref 20–31)
CREAT BLD-MCNC: 0.97 MG/DL (ref 0.6–1.3)
DEPRECATED RDW RBC AUTO: 45.5 FL (ref 37–54)
EOSINOPHIL # BLD AUTO: 0.18 10*3/MM3 (ref 0–0.3)
EOSINOPHIL NFR BLD AUTO: 2.5 % (ref 0–3)
ERYTHROCYTE [DISTWIDTH] IN BLOOD BY AUTOMATED COUNT: 13.3 % (ref 11.3–14.5)
GFR SERPL CREATININE-BSD FRML MDRD: 81 ML/MIN/1.73
GLOBULIN UR ELPH-MCNC: 1.9 GM/DL
GLUCOSE BLD-MCNC: 91 MG/DL (ref 70–100)
HCT VFR BLD AUTO: 45.5 % (ref 38.9–50.9)
HGB BLD-MCNC: 15.2 G/DL (ref 13.1–17.5)
IMM GRANULOCYTES # BLD: 0.03 10*3/MM3 (ref 0–0.03)
IMM GRANULOCYTES NFR BLD: 0.4 % (ref 0–0.6)
LIPASE SERPL-CCNC: 26 U/L (ref 6–51)
LYMPHOCYTES # BLD AUTO: 2.43 10*3/MM3 (ref 0.6–4.8)
LYMPHOCYTES NFR BLD AUTO: 33.7 % (ref 24–44)
MCH RBC QN AUTO: 31 PG (ref 27–31)
MCHC RBC AUTO-ENTMCNC: 33.4 G/DL (ref 32–36)
MCV RBC AUTO: 92.9 FL (ref 80–99)
MONOCYTES # BLD AUTO: 0.63 10*3/MM3 (ref 0–1)
MONOCYTES NFR BLD AUTO: 8.7 % (ref 0–12)
NEUTROPHILS # BLD AUTO: 3.94 10*3/MM3 (ref 1.5–8.3)
NEUTROPHILS NFR BLD AUTO: 54.7 % (ref 41–71)
PLATELET # BLD AUTO: 246 10*3/MM3 (ref 150–450)
PMV BLD AUTO: 13.3 FL (ref 6–12)
POTASSIUM BLD-SCNC: 4.7 MMOL/L (ref 3.5–5.5)
PROT SERPL-MCNC: 6.5 G/DL (ref 5.7–8.2)
RBC # BLD AUTO: 4.9 10*6/MM3 (ref 4.2–5.76)
SODIUM BLD-SCNC: 140 MMOL/L (ref 132–146)
WBC NRBC COR # BLD: 7.21 10*3/MM3 (ref 3.5–10.8)

## 2018-09-21 PROCEDURE — 36415 COLL VENOUS BLD VENIPUNCTURE: CPT | Performed by: FAMILY MEDICINE

## 2018-09-21 PROCEDURE — 85025 COMPLETE CBC W/AUTO DIFF WBC: CPT | Performed by: FAMILY MEDICINE

## 2018-09-21 PROCEDURE — 80053 COMPREHEN METABOLIC PANEL: CPT | Performed by: FAMILY MEDICINE

## 2018-09-21 PROCEDURE — 99214 OFFICE O/P EST MOD 30 MIN: CPT | Performed by: FAMILY MEDICINE

## 2018-09-21 PROCEDURE — 83690 ASSAY OF LIPASE: CPT | Performed by: FAMILY MEDICINE

## 2018-09-21 RX ORDER — METRONIDAZOLE 500 MG/1
500 TABLET ORAL 3 TIMES DAILY
Qty: 21 TABLET | Refills: 0 | Status: SHIPPED | OUTPATIENT
Start: 2018-09-21 | End: 2019-01-29

## 2018-09-21 RX ORDER — OMEPRAZOLE 40 MG/1
40 CAPSULE, DELAYED RELEASE ORAL DAILY
Qty: 30 CAPSULE | Refills: 0 | Status: SHIPPED | OUTPATIENT
Start: 2018-09-21 | End: 2018-09-21 | Stop reason: SDUPTHER

## 2018-09-21 RX ORDER — ONDANSETRON 4 MG/1
4 TABLET, FILM COATED ORAL EVERY 8 HOURS PRN
Qty: 30 TABLET | Refills: 0 | Status: SHIPPED | OUTPATIENT
Start: 2018-09-21 | End: 2019-01-29

## 2018-09-21 NOTE — PROGRESS NOTES
"Leonor Valencia is a 53 y.o. male.     History of Present Illness   He describes a chronic history of GERD with a hiatal hernia and Nissen Fundoplication ~ 1998.  He reports an EGD in 2011 with dilatation of his distal esophagus.  He describes non-compliance with his previously prescribed PPI.  He is now experiencing burning epigastric discomfort that radiates to his right upper quadrant since May.  The burning sensation seems to be worse in the evening.  He describes associated nausea but no emesis.  He denies crescendo abdominal pain, melena or hematemesis.  He reports associated, intermittent dysphagia since May.  He avoids dry bread and meats.  He is also concerned with \"foamy stool\" and excessive flatulence.  He reports intermittent loose to soft stools.  He reports associated abdominal distension.  He denies associated fever, chills, travel abroad, drinking from streams, well water or lakes.  He denies retrosternal chest pain, dyspnea or racing heart beats.  He is very active with his job at [employer].  He describes ongoing cigarette smoking with no present plans to discontinue.    Review of Systems   Constitutional: Negative for activity change, appetite change, chills, fever and unexpected weight change.   HENT: Positive for trouble swallowing. Negative for nosebleeds and sore throat.    Respiratory: Negative for chest tightness and shortness of breath.    Cardiovascular: Negative for chest pain, palpitations and leg swelling.   Gastrointestinal: Positive for abdominal distention, diarrhea and nausea. Negative for abdominal pain, anal bleeding, blood in stool, constipation and vomiting.   Endocrine: Negative for polydipsia, polyphagia and polyuria.   Genitourinary: Negative for dysuria.   Skin: Negative for rash.   Hematological: Does not bruise/bleed easily.       Objective   Physical Exam   Constitutional: He is oriented to person, place, and time. He appears well-developed and well-nourished. He is " cooperative.   HENT:   Head: Normocephalic and atraumatic.   Right Ear: Hearing and external ear normal.   Left Ear: Hearing and external ear normal.   Nose: Nose normal.   Mouth/Throat: Uvula is midline, oropharynx is clear and moist and mucous membranes are normal.   Eyes: Pupils are equal, round, and reactive to light. Conjunctivae and EOM are normal. No scleral icterus.   Neck: Trachea normal and normal range of motion. Neck supple. No JVD present. Carotid bruit is not present. No thyromegaly present.   Cardiovascular: Normal rate, regular rhythm, normal heart sounds and intact distal pulses.    Pulmonary/Chest: Effort normal and breath sounds normal.   Abdominal: Soft. Bowel sounds are normal. There is no hepatosplenomegaly. There is tenderness in the epigastric area. There is no rebound and no guarding.   Musculoskeletal: Normal range of motion.   Lymphadenopathy:     He has no cervical adenopathy.   Neurological: He is alert and oriented to person, place, and time. He has normal strength and normal reflexes. No sensory deficit. Gait normal.   Skin: Skin is warm and dry.   Psychiatric: He has a normal mood and affect. His speech is normal and behavior is normal. Judgment and thought content normal. Cognition and memory are normal.   Nursing note and vitals reviewed.      Assessment/Plan   Diagnoses and all orders for this visit:    Chronic GERD  -     omeprazole (PRILOSEC) 40 MG capsule; Take 1 capsule by mouth Daily.  -     ondansetron (ZOFRAN) 4 MG tablet; Take 1 tablet by mouth Every 8 (Eight) Hours As Needed for Nausea.  - HOB elevated  - Avoid late night meals  - Avoid excessive caffeine   - Avoid excessive carbonated beverages  - Avoid excessive etoh    Esophageal dysphagia  -     omeprazole (PRILOSEC) 40 MG capsule; Take 1 capsule by mouth Daily.  -     Comprehensive Metabolic Panel  -     CBC & Differential  -     Lipase  -     Ambulatory Referral to Gastroenterology  -     Go to the ED with any sudden  changes    Flatulence with foamy stool  -     metroNIDAZOLE (FLAGYL) 500 MG tablet; Take 1 tablet by mouth 3 (Three) Times a Day.  -     Comprehensive Metabolic Panel  -     CBC & Differential  -     Lipase  -     We discussed giardiasis    Tobacco cessation education.  Further recommendations pending lab evaluation.  RTC for annual fasting wellness in October.              normal...

## 2018-09-22 RX ORDER — OMEPRAZOLE 40 MG/1
40 CAPSULE, DELAYED RELEASE ORAL DAILY
Qty: 90 CAPSULE | Refills: 0 | Status: SHIPPED | OUTPATIENT
Start: 2018-09-22 | End: 2020-03-11

## 2019-01-18 ENCOUNTER — OFFICE VISIT (OUTPATIENT)
Dept: GASTROENTEROLOGY | Facility: CLINIC | Age: 54
End: 2019-01-18

## 2019-01-18 VITALS
DIASTOLIC BLOOD PRESSURE: 84 MMHG | WEIGHT: 185 LBS | OXYGEN SATURATION: 99 % | SYSTOLIC BLOOD PRESSURE: 128 MMHG | TEMPERATURE: 97.8 F | BODY MASS INDEX: 28.04 KG/M2 | HEIGHT: 68 IN | HEART RATE: 80 BPM

## 2019-01-18 DIAGNOSIS — G89.29 CHRONIC ABDOMINAL PAIN: Primary | ICD-10-CM

## 2019-01-18 DIAGNOSIS — R10.9 CHRONIC ABDOMINAL PAIN: Primary | ICD-10-CM

## 2019-01-18 PROCEDURE — 99203 OFFICE O/P NEW LOW 30 MIN: CPT | Performed by: INTERNAL MEDICINE

## 2019-01-18 RX ORDER — FLUOXETINE HYDROCHLORIDE 20 MG/1
20 CAPSULE ORAL DAILY
COMMUNITY
End: 2020-02-18

## 2019-01-25 ENCOUNTER — APPOINTMENT (OUTPATIENT)
Dept: GENERAL RADIOLOGY | Facility: HOSPITAL | Age: 54
End: 2019-01-25

## 2019-01-25 ENCOUNTER — HOSPITAL ENCOUNTER (EMERGENCY)
Facility: HOSPITAL | Age: 54
Discharge: HOME | End: 2019-01-25
Attending: EMERGENCY MEDICINE | Admitting: EMERGENCY MEDICINE

## 2019-01-25 VITALS
HEART RATE: 61 BPM | BODY MASS INDEX: 25.01 KG/M2 | OXYGEN SATURATION: 97 % | HEIGHT: 68 IN | SYSTOLIC BLOOD PRESSURE: 114 MMHG | RESPIRATION RATE: 18 BRPM | WEIGHT: 165 LBS | TEMPERATURE: 98.5 F | DIASTOLIC BLOOD PRESSURE: 81 MMHG

## 2019-01-25 DIAGNOSIS — R07.9 CHEST PAIN, UNSPECIFIED TYPE: Primary | ICD-10-CM

## 2019-01-25 LAB
ALBUMIN SERPL-MCNC: 4.51 G/DL (ref 3.2–4.8)
ALBUMIN/GLOB SERPL: 2.2 G/DL (ref 1.5–2.5)
ALP SERPL-CCNC: 82 U/L (ref 25–100)
ALT SERPL W P-5'-P-CCNC: 21 U/L (ref 7–40)
ANION GAP SERPL CALCULATED.3IONS-SCNC: 5 MMOL/L (ref 3–11)
AST SERPL-CCNC: 21 U/L (ref 0–33)
BASOPHILS # BLD AUTO: 0.02 10*3/MM3 (ref 0–0.2)
BASOPHILS NFR BLD AUTO: 0.3 % (ref 0–1)
BILIRUB SERPL-MCNC: 0.6 MG/DL (ref 0.3–1.2)
BNP SERPL-MCNC: 9 PG/ML (ref 0–100)
BUN BLD-MCNC: 18 MG/DL (ref 9–23)
BUN/CREAT SERPL: 17.3 (ref 7–25)
CALCIUM SPEC-SCNC: 9.1 MG/DL (ref 8.7–10.4)
CHLORIDE SERPL-SCNC: 104 MMOL/L (ref 99–109)
CO2 SERPL-SCNC: 28 MMOL/L (ref 20–31)
CREAT BLD-MCNC: 1.04 MG/DL (ref 0.6–1.3)
DEPRECATED RDW RBC AUTO: 44.8 FL (ref 37–54)
EOSINOPHIL # BLD AUTO: 0.11 10*3/MM3 (ref 0–0.3)
EOSINOPHIL NFR BLD AUTO: 1.7 % (ref 0–3)
ERYTHROCYTE [DISTWIDTH] IN BLOOD BY AUTOMATED COUNT: 13 % (ref 11.3–14.5)
GFR SERPL CREATININE-BSD FRML MDRD: 75 ML/MIN/1.73
GLOBULIN UR ELPH-MCNC: 2.1 GM/DL
GLUCOSE BLD-MCNC: 162 MG/DL (ref 70–100)
HCT VFR BLD AUTO: 44.9 % (ref 38.9–50.9)
HGB BLD-MCNC: 15.1 G/DL (ref 13.1–17.5)
HOLD SPECIMEN: NORMAL
HOLD SPECIMEN: NORMAL
IMM GRANULOCYTES # BLD AUTO: 0.02 10*3/MM3 (ref 0–0.03)
IMM GRANULOCYTES NFR BLD AUTO: 0.3 % (ref 0–0.6)
LIPASE SERPL-CCNC: 35 U/L (ref 6–51)
LYMPHOCYTES # BLD AUTO: 1.66 10*3/MM3 (ref 0.6–4.8)
LYMPHOCYTES NFR BLD AUTO: 25.8 % (ref 24–44)
MCH RBC QN AUTO: 31.5 PG (ref 27–31)
MCHC RBC AUTO-ENTMCNC: 33.6 G/DL (ref 32–36)
MCV RBC AUTO: 93.5 FL (ref 80–99)
MONOCYTES # BLD AUTO: 0.55 10*3/MM3 (ref 0–1)
MONOCYTES NFR BLD AUTO: 8.5 % (ref 0–12)
NEUTROPHILS # BLD AUTO: 4.1 10*3/MM3 (ref 1.5–8.3)
NEUTROPHILS NFR BLD AUTO: 63.7 % (ref 41–71)
PLATELET # BLD AUTO: 255 10*3/MM3 (ref 150–450)
PMV BLD AUTO: 12.8 FL (ref 6–12)
POTASSIUM BLD-SCNC: 4.1 MMOL/L (ref 3.5–5.5)
PROT SERPL-MCNC: 6.6 G/DL (ref 5.7–8.2)
RBC # BLD AUTO: 4.8 10*6/MM3 (ref 4.2–5.76)
SODIUM BLD-SCNC: 137 MMOL/L (ref 132–146)
TROPONIN I SERPL-MCNC: <0.006 NG/ML
WBC NRBC COR # BLD: 6.44 10*3/MM3 (ref 3.5–10.8)
WHOLE BLOOD HOLD SPECIMEN: NORMAL
WHOLE BLOOD HOLD SPECIMEN: NORMAL

## 2019-01-25 PROCEDURE — 83880 ASSAY OF NATRIURETIC PEPTIDE: CPT | Performed by: EMERGENCY MEDICINE

## 2019-01-25 PROCEDURE — 71045 X-RAY EXAM CHEST 1 VIEW: CPT

## 2019-01-25 PROCEDURE — 80053 COMPREHEN METABOLIC PANEL: CPT | Performed by: EMERGENCY MEDICINE

## 2019-01-25 PROCEDURE — 85025 COMPLETE CBC W/AUTO DIFF WBC: CPT

## 2019-01-25 PROCEDURE — 93005 ELECTROCARDIOGRAM TRACING: CPT

## 2019-01-25 PROCEDURE — 83690 ASSAY OF LIPASE: CPT | Performed by: EMERGENCY MEDICINE

## 2019-01-25 PROCEDURE — 84484 ASSAY OF TROPONIN QUANT: CPT | Performed by: EMERGENCY MEDICINE

## 2019-01-25 PROCEDURE — 36415 COLL VENOUS BLD VENIPUNCTURE: CPT

## 2019-01-25 PROCEDURE — 93005 ELECTROCARDIOGRAM TRACING: CPT | Performed by: EMERGENCY MEDICINE

## 2019-01-25 PROCEDURE — 99285 EMERGENCY DEPT VISIT HI MDM: CPT

## 2019-01-25 RX ORDER — NITROGLYCERIN 0.4 MG/1
0.4 TABLET SUBLINGUAL
Status: DISCONTINUED | OUTPATIENT
Start: 2019-01-25 | End: 2019-01-25 | Stop reason: HOSPADM

## 2019-01-25 RX ORDER — NITROGLYCERIN 0.4 MG/1
0.4 TABLET SUBLINGUAL
Qty: 90 TABLET | Refills: 0 | Status: SHIPPED | OUTPATIENT
Start: 2019-01-25 | End: 2020-03-11

## 2019-01-25 RX ORDER — ASPIRIN 81 MG/1
324 TABLET, CHEWABLE ORAL ONCE
Status: DISCONTINUED | OUTPATIENT
Start: 2019-01-25 | End: 2019-01-25 | Stop reason: HOSPADM

## 2019-01-25 RX ORDER — SODIUM CHLORIDE 0.9 % (FLUSH) 0.9 %
10 SYRINGE (ML) INJECTION AS NEEDED
Status: DISCONTINUED | OUTPATIENT
Start: 2019-01-25 | End: 2019-01-25 | Stop reason: HOSPADM

## 2019-01-25 RX ADMIN — NITROGLYCERIN 0.4 MG: 0.4 TABLET SUBLINGUAL at 09:40

## 2019-01-25 RX ADMIN — NITROGLYCERIN 0.4 MG: 0.4 TABLET SUBLINGUAL at 06:43

## 2019-01-25 NOTE — ED PROVIDER NOTES
"Subjective   Pt complains of chest pain onset this morning.  He was at work but had not begun working yet.  He was drinking a cup of coffee and developed a heaviness across his upper chest (draws a line from shoulder to shoulder) radiating to both axillae.  There is mild nausea and no dyspnea, diaphoresis or dizziness.  He has never had this sensation before.  It improved some after ASA from EMS but is still present.  He refused NTG en route, said he was \"afraid\" of it but can't tell me why.  He is a smoker and has a history of GERD s/p Nissen.  He recently started taking rifaximin for SIBO.  He does not have a history of HTN, DM or HL.  Never had a stress test or heart cath.        History provided by:  Patient      Review of Systems   Constitutional: Negative for fever.   Respiratory: Negative for shortness of breath.    Cardiovascular: Positive for chest pain.   Gastrointestinal: Positive for nausea. Negative for vomiting.   Neurological: Negative for dizziness.   All other systems reviewed and are negative.      Past Medical History:   Diagnosis Date   • Bell's palsy    • Diverticulosis    • AIRAM (generalized anxiety disorder)    • GERD (gastroesophageal reflux disease)    • Hiatal hernia    • Tobacco dependence        No Known Allergies    Past Surgical History:   Procedure Laterality Date   • COLONOSCOPY  2016   • INGUINAL HERNIA REPAIR Bilateral 1966   • NEUROPLASTY / TRANSPOSITION ULNAR NERVE AT ELBOW Left 2012   • NISSEN FUNDOPLICATION  1999   • UPPER GASTROINTESTINAL ENDOSCOPY  2016       Family History   Problem Relation Age of Onset   • Diabetes Mother    • Lung cancer Father        Social History     Socioeconomic History   • Marital status:      Spouse name: Cesar   • Number of children: 0   • Years of education: H.S.   • Highest education level: Not on file   Occupational History   • Occupation:      Employer: [employer]   Tobacco Use   • Smoking status: Current Every Day Smoker     " Packs/day: 1.00     Years: 22.00     Pack years: 22.00     Types: Cigarettes   • Smokeless tobacco: Never Used   Substance and Sexual Activity   • Alcohol use: No   • Drug use: No   • Sexual activity: Yes     Partners: Female           Objective   Physical Exam   Constitutional: He appears well-developed and well-nourished. He is cooperative.  Non-toxic appearance. No distress.   HENT:   Head: Normocephalic and atraumatic.   Mouth/Throat: Oropharynx is clear and moist and mucous membranes are normal.   Eyes: Conjunctivae and EOM are normal. No scleral icterus.   Neck: Normal range of motion. Neck supple.   Cardiovascular: Normal rate, regular rhythm and normal heart sounds.   No murmur heard.  Pulmonary/Chest: Effort normal and breath sounds normal. No respiratory distress. He has no wheezes. He has no rhonchi. He has no rales.   Abdominal: Soft. Bowel sounds are normal. There is no tenderness. There is no rebound and no guarding.   Musculoskeletal: Normal range of motion.   Neurological: He is alert. He has normal strength.   Skin: Skin is warm and dry. No rash noted.   Psychiatric: He has a normal mood and affect. His speech is normal and behavior is normal.   Nursing note and vitals reviewed.      Procedures           ED Course      EKG NSR.  CXR negative. Labs benign.  Resolution of pain with NTG SL x1, but it did return.  2nd trop negative.  Due to recurrence of pain another NTG SL was given with resolution, and we checked a third troponin which remained undetectable.  Chest pain free on subsequent rechecks.  Workup including three cardiac sets is negative for acute serious pathology.  I discussed findings and concerns with patient in detail.  We discussed in particular that while acute MI has been excluded today, it is not possible to exclude underlying CAD based on ED workup alone.  We discussed the importance of immediate-term followup for provocative testing such as stress testing as well as methods of  arranging that.  We discussed the importance of office followup subsequent to testing to discuss results and any further testing or treatment that may be indicated.  Patient is advised to return to the ED for any concerning symptoms, especially prior to the completion of further outpatient testing.              MDM  Number of Diagnoses or Management Options  Chest pain, unspecified type:      Amount and/or Complexity of Data Reviewed  Clinical lab tests: ordered and reviewed  Tests in the radiology section of CPT®: ordered and reviewed  Decide to obtain previous medical records or to obtain history from someone other than the patient: yes  Independent visualization of images, tracings, or specimens: yes          Final diagnoses:   Chest pain, unspecified type            Festus Manjarrez MD  01/25/19 [id number]

## 2019-01-28 NOTE — PROGRESS NOTES
"[facility]      Encounter Date:01/29/2019     Gerhard Valencia  [address] [facility] [address]  [unfilled]    1965    Jacob Hobbs MD    Gerhard Valencia is a 53 y.o. male.      Subjective:     Chief Complaint:   New patient-Chest pain    HPI   Patient is a 52 YO M with past medical history significant for GERD s/p Nissen Fundoplication and tobacco abuse who presents to the [facility] for evaluation of chest pain at the request of Dr. Manjarrez. Patient presented to the ED on 1/25/18 with complaints of acute chest heaviness across his upper chest wall with associated nausea. Pain resolved after 2nd nitro. He describes pain as feeling like a \"stack of books\" was on his chest. He tried to walk it off but this actually made it worse. He had some worsening pain this past Sunday and had to take nitro, which again resolved the pain. He reports decrease in exertional tolerance recently. Feels more short of breath and now feels that heaviness is worse when he exerts himself. Of note, he does have history of esophageal stricture and does get dilations periodically. He states this pain is different and much worse.    Cardiac risk factors:  Tobacco use (1ppd x22 yrs), sedentary lifestyle, gender        Past Medical History:   Diagnosis Date   • Bell's palsy    • Diverticulosis    • AIRAM (generalized anxiety disorder)    • GERD (gastroesophageal reflux disease)    • Hiatal hernia    • Tobacco dependence        Past Surgical History:   Procedure Laterality Date   • COLONOSCOPY  2016   • HIATAL HERNIA REPAIR  2004   • INGUINAL HERNIA REPAIR Bilateral 1966   • NEUROPLASTY / TRANSPOSITION ULNAR NERVE AT ELBOW Left 2012   • NISSEN FUNDOPLICATION  1999   • UPPER GASTROINTESTINAL ENDOSCOPY  2016       Family History   Problem Relation Age of Onset   • Diabetes Mother    • Lung cancer Father    • Diabetes Brother    • Diabetes Maternal Grandmother    • Heart disease Maternal " Grandmother    • No Known Problems Maternal Grandfather    • No Known Problems Paternal Grandmother    • No Known Problems Paternal Grandfather    • Heart disease Maternal Aunt        Social History     Socioeconomic History   • Marital status:      Spouse name: Cesar   • Number of children: 0   • Years of education: H.S.   • Highest education level: Not on file   Social Needs   • Financial resource strain: Not hard at all   • Food insecurity - worry: Not on file   • Food insecurity - inability: Never true   • Transportation needs - medical: No   • Transportation needs - non-medical: No   Occupational History   • Occupation:      Employer: Owned it   Tobacco Use   • Smoking status: Current Every Day Smoker     Packs/day: 1.00     Years: 22.00     Pack years: 22.00     Types: Cigarettes   • Smokeless tobacco: Never Used   Substance and Sexual Activity   • Alcohol use: Yes     Comment: rarely   • Drug use: No   • Sexual activity: Yes     Partners: Female   Other Topics Concern   • Not on file   Social History Narrative    Caffeine Intake:4-5  servings per day    Patient lives at home with is wife       No Known Allergies      Current Outpatient Medications:   •  FLUoxetine (PROzac) 20 MG capsule, Take 20 mg by mouth Daily. 60mg total, Disp: , Rfl:   •  FLUoxetine (PROzac) 40 MG capsule, Take 40 mg by mouth Daily. 60mg total, Disp: , Rfl: 1  •  nitroglycerin (NITROSTAT) 0.4 MG SL tablet, Place 1 tablet under the tongue Every 5 (Five) Minutes As Needed for Chest Pain. Take no more than 3 doses in 15 minutes., Disp: 90 tablet, Rfl: 0  •  omeprazole (priLOSEC) 40 MG capsule, TAKE 1 CAPSULE BY MOUTH DAILY (Patient taking differently: Take 40 mg by mouth Daily As Needed.), Disp: 90 capsule, Rfl: 0    The following portions of the patient's history were reviewed and updated as appropriate: allergies, current medications, past family history, past medical history, past social history, past surgical history  "and problem list.    Review of Systems   Constitution: Negative for chills and fever.   HENT: Negative.    Eyes: Negative.    Cardiovascular: Positive for chest pain and dyspnea on exertion. Negative for claudication, cyanosis, irregular heartbeat, leg swelling, near-syncope, orthopnea, palpitations, paroxysmal nocturnal dyspnea and syncope.   Respiratory: Negative for cough, shortness of breath and snoring.    Endocrine: Negative.    Hematologic/Lymphatic: Does not bruise/bleed easily.   Skin: Negative for poor wound healing.   Musculoskeletal: Negative.    Gastrointestinal: Positive for bloating, diarrhea and nausea. Negative for abdominal pain, heartburn, hematemesis, melena and vomiting.   Genitourinary: Negative.  Negative for hematuria.   Neurological: Negative.    Psychiatric/Behavioral: The patient is nervous/anxious.    Allergic/Immunologic: Negative.        Objective:     Vitals:    01/29/19 0835 01/29/19 0836 01/29/19 0838   BP: 129/90 120/79 119/77   BP Location: Right arm Left arm Left arm   Patient Position: Sitting Sitting Standing   Pulse: 76 78 77   Resp: 18     Temp: 97.6 °F (36.4 °C)     TempSrc: Temporal     SpO2: 98%     Weight: 82.6 kg (182 lb)     Height: 172.7 cm (68\")         Physical Exam   Constitutional: He is oriented to person, place, and time. He appears well-developed and well-nourished. No distress.   HENT:   Head: Normocephalic.   Eyes: Conjunctivae are normal. Pupils are equal, round, and reactive to light.   Neck: Neck supple. No JVD present. No thyromegaly present.   Cardiovascular: Normal rate, regular rhythm, normal heart sounds and intact distal pulses. Exam reveals no gallop and no friction rub.   No murmur heard.  Pulmonary/Chest: Effort normal and breath sounds normal. No respiratory distress. He has no wheezes. He has no rales. He exhibits no tenderness.   Abdominal: Soft. Bowel sounds are normal.   Musculoskeletal: Normal range of motion. He exhibits no edema. "   Neurological: He is alert and oriented to person, place, and time.   Skin: Skin is warm and dry.   Psychiatric: He has a normal mood and affect. His behavior is normal. Thought content normal.   Vitals reviewed.      Lab and Diagnostic Review:  Results for orders placed or performed during the hospital encounter of 01/25/19   Comprehensive Metabolic Panel   Result Value Ref Range    Glucose 162 (H) 70 - 100 mg/dL    BUN 18 9 - 23 mg/dL    Creatinine 1.04 0.60 - 1.30 mg/dL    Sodium 137 132 - 146 mmol/L    Potassium 4.1 3.5 - 5.5 mmol/L    Chloride 104 99 - 109 mmol/L    CO2 28.0 20.0 - 31.0 mmol/L    Calcium 9.1 8.7 - 10.4 mg/dL    Total Protein 6.6 5.7 - 8.2 g/dL    Albumin 4.51 3.20 - 4.80 g/dL    ALT (SGPT) 21 7 - 40 U/L    AST (SGOT) 21 0 - 33 U/L    Alkaline Phosphatase 82 25 - 100 U/L    Total Bilirubin 0.6 0.3 - 1.2 mg/dL    eGFR Non African Amer 75 >60 mL/min/1.73    Globulin 2.1 gm/dL    A/G Ratio 2.2 1.5 - 2.5 g/dL    BUN/Creatinine Ratio 17.3 7.0 - 25.0    Anion Gap 5.0 3.0 - 11.0 mmol/L   Lipase   Result Value Ref Range    Lipase 35 6 - 51 U/L   BNP   Result Value Ref Range    BNP 9.0 0.0 - 100.0 pg/mL   CBC Auto Differential   Result Value Ref Range    WBC 6.44 3.50 - 10.80 10*3/mm3    RBC 4.80 4.20 - 5.76 10*6/mm3    Hemoglobin 15.1 13.1 - 17.5 g/dL    Hematocrit 44.9 38.9 - 50.9 %    MCV 93.5 80.0 - 99.0 fL    MCH 31.5 (H) 27.0 - 31.0 pg    MCHC 33.6 32.0 - 36.0 g/dL    RDW 13.0 11.3 - 14.5 %    RDW-SD 44.8 37.0 - 54.0 fl    MPV 12.8 (H) 6.0 - 12.0 fL    Platelets 255 150 - 450 10*3/mm3    Neutrophil % 63.7 41.0 - 71.0 %    Lymphocyte % 25.8 24.0 - 44.0 %    Monocyte % 8.5 0.0 - 12.0 %    Eosinophil % 1.7 0.0 - 3.0 %    Basophil % 0.3 0.0 - 1.0 %    Immature Grans % 0.3 0.0 - 0.6 %    Neutrophils, Absolute 4.10 1.50 - 8.30 10*3/mm3    Lymphocytes, Absolute 1.66 0.60 - 4.80 10*3/mm3    Monocytes, Absolute 0.55 0.00 - 1.00 10*3/mm3    Eosinophils, Absolute 0.11 0.00 - 0.30 10*3/mm3    Basophils,  Absolute 0.02 0.00 - 0.20 10*3/mm3    Immature Grans, Absolute 0.02 0.00 - 0.03 10*3/mm3   Troponin   Result Value Ref Range    Troponin I <0.006 <=0.039 ng/mL   Troponin   Result Value Ref Range    Troponin I <0.006 <=0.039 ng/mL   Troponin   Result Value Ref Range    Troponin I <0.006 <=0.039 ng/mL     EKG in ED: NSR    Assessment and Plan:   1. Stable angina (CMS/HCC)  - BORIS risk score 1. However, symptoms concerning for angina  - Stress Test With Myocardial Perfusion (1 Day); Future  - Adult Transthoracic Echo Complete W/ Cont if Necessary Per Protocol; Future    2. Shortness of breath  - Stress Test With Myocardial Perfusion (1 Day); Future  - Adult Transthoracic Echo Complete W/ Cont if Necessary Per Protocol; Future    3. Tobacco abuse  - Discussed risks for ASCD with tobacco abuse  - Stress Test With Myocardial Perfusion (1 Day); Future  - Adult Transthoracic Echo Complete W/ Cont if Necessary Per Protocol; Future    Further plans pending the above evaluation    It has been a pleasure to participate in the care of this patient.  Patient was instructed to call the [facility] with any questions, concerns, or worsening symptoms.    *Please note that portions of this note were completed with a voice recognition program. Efforts were made to edit the dictations, but occasionally words are mistranscribed.

## 2019-01-29 ENCOUNTER — HOSPITAL ENCOUNTER (OUTPATIENT)
Dept: CARDIOLOGY | Facility: HOSPITAL | Age: 54
Discharge: HOME | End: 2019-01-29
Attending: NURSE PRACTITIONER

## 2019-01-29 ENCOUNTER — OFFICE VISIT (OUTPATIENT)
Dept: CARDIOLOGY | Facility: HOSPITAL | Age: 54
End: 2019-01-29

## 2019-01-29 VITALS — BODY MASS INDEX: 27.58 KG/M2 | HEIGHT: 68 IN | WEIGHT: 182 LBS

## 2019-01-29 VITALS
WEIGHT: 182 LBS | HEIGHT: 68 IN | OXYGEN SATURATION: 98 % | BODY MASS INDEX: 27.58 KG/M2 | TEMPERATURE: 97.6 F | DIASTOLIC BLOOD PRESSURE: 77 MMHG | RESPIRATION RATE: 18 BRPM | HEART RATE: 77 BPM | SYSTOLIC BLOOD PRESSURE: 119 MMHG

## 2019-01-29 DIAGNOSIS — Z72.0 TOBACCO ABUSE: ICD-10-CM

## 2019-01-29 DIAGNOSIS — I20.8 STABLE ANGINA (HCC): Primary | ICD-10-CM

## 2019-01-29 DIAGNOSIS — R06.02 SHORTNESS OF BREATH: ICD-10-CM

## 2019-01-29 DIAGNOSIS — I20.8 STABLE ANGINA (HCC): ICD-10-CM

## 2019-01-29 LAB
BH CV ECHO MEAS - AO ROOT DIAM: 2.9 CM
BH CV ECHO MEAS - BSA(HAYCOCK): 2 M^2
BH CV ECHO MEAS - BSA: 2 M^2
BH CV ECHO MEAS - BZI_BMI: 27.7 KILOGRAMS/M^2
BH CV ECHO MEAS - BZI_METRIC_HEIGHT: 172.7 CM
BH CV ECHO MEAS - BZI_METRIC_WEIGHT: 82.6 KG
BH CV ECHO MEAS - EF(MOD-BP): 59 %
BH CV ECHO MEAS - IVSD: 1 CM
BH CV ECHO MEAS - LA DIMENSION: 3.1 CM
BH CV ECHO MEAS - LAT PEAK E' VEL: 9.5 CM/SEC
BH CV ECHO MEAS - LVIDD: 4.5 CM
BH CV ECHO MEAS - LVIDS: 3 CM
BH CV ECHO MEAS - LVPWD: 1 CM
BH CV ECHO MEAS - MED PEAK E' VEL: 10.2 CM/SEC
BH CV ECHO MEAS - TAPSE (>1.6): 2.3 CM2
BH CV STRESS BP STAGE 1: NORMAL
BH CV STRESS BP STAGE 2: NORMAL
BH CV STRESS BP STAGE 3: NORMAL
BH CV STRESS DURATION MIN STAGE 1: 3
BH CV STRESS DURATION MIN STAGE 2: 3
BH CV STRESS DURATION MIN STAGE 3: 3
BH CV STRESS DURATION MIN STAGE 4: 1
BH CV STRESS DURATION SEC STAGE 1: 0
BH CV STRESS DURATION SEC STAGE 2: 0
BH CV STRESS DURATION SEC STAGE 3: 0
BH CV STRESS DURATION SEC STAGE 4: 30
BH CV STRESS GRADE STAGE 1: 10
BH CV STRESS GRADE STAGE 2: 12
BH CV STRESS GRADE STAGE 3: 14
BH CV STRESS GRADE STAGE 4: 16
BH CV STRESS HR STAGE 1: 108
BH CV STRESS HR STAGE 2: 120
BH CV STRESS HR STAGE 3: 130
BH CV STRESS HR STAGE 4: 127
BH CV STRESS METS STAGE 1: 5
BH CV STRESS METS STAGE 2: 7.5
BH CV STRESS METS STAGE 3: 10
BH CV STRESS METS STAGE 4: 13.5
BH CV STRESS O2 STAGE 1: 92
BH CV STRESS O2 STAGE 2: 100
BH CV STRESS O2 STAGE 3: 99
BH CV STRESS O2 STAGE 4: 99
BH CV STRESS PROTOCOL 1: NORMAL
BH CV STRESS RECOVERY BP: NORMAL MMHG
BH CV STRESS RECOVERY HR: 94 BPM
BH CV STRESS RECOVERY O2: 99 %
BH CV STRESS SPEED STAGE 1: 1.7
BH CV STRESS SPEED STAGE 2: 2.5
BH CV STRESS SPEED STAGE 3: 3.4
BH CV STRESS SPEED STAGE 4: 4.2
BH CV STRESS STAGE 1: 1
BH CV STRESS STAGE 2: 2
BH CV STRESS STAGE 3: 3
BH CV STRESS STAGE 4: 4
BH CV VAS BP RIGHT ARM: NORMAL MMHG
BH CV XLRA - RV BASE: 4 CM
BH CV XLRA - RV LENGTH: 5.7 CM
BH CV XLRA - RV MID: 2.7 CM
BH CV XLRA - TDI S': 12.5 CM/SEC
LEFT ATRIUM VOLUME INDEX: 21.9 ML/M2
LV EF 2D ECHO EST: 65 %
LV EF NUC BP: 71 %
MAXIMAL PREDICTED HEART RATE: 168 BPM
MAXIMAL PREDICTED HEART RATE: 168 BPM
PERCENT MAX PREDICTED HR: 84.52 %
STRESS BASELINE BP: NORMAL MMHG
STRESS BASELINE HR: 69 BPM
STRESS O2 SAT REST: 99 %
STRESS PERCENT HR: 99 %
STRESS POST ESTIMATED WORKLOAD: 10.8 METS
STRESS POST EXERCISE DUR MIN: 10 MIN
STRESS POST EXERCISE DUR SEC: 30 SEC
STRESS POST O2 SAT PEAK: 99 %
STRESS POST PEAK BP: NORMAL MMHG
STRESS POST PEAK HR: 142 BPM
STRESS TARGET HR: 143 BPM
STRESS TARGET HR: 143 BPM

## 2019-01-29 PROCEDURE — 78452 HT MUSCLE IMAGE SPECT MULT: CPT | Performed by: INTERNAL MEDICINE

## 2019-01-29 PROCEDURE — 93306 TTE W/DOPPLER COMPLETE: CPT

## 2019-01-29 PROCEDURE — 99204 OFFICE O/P NEW MOD 45 MIN: CPT | Performed by: NURSE PRACTITIONER

## 2019-01-29 PROCEDURE — 93018 CV STRESS TEST I&R ONLY: CPT | Performed by: INTERNAL MEDICINE

## 2019-01-29 PROCEDURE — 93306 TTE W/DOPPLER COMPLETE: CPT | Performed by: INTERNAL MEDICINE

## 2019-01-29 PROCEDURE — 78452 HT MUSCLE IMAGE SPECT MULT: CPT

## 2019-01-29 PROCEDURE — 93017 CV STRESS TEST TRACING ONLY: CPT

## 2019-01-29 PROCEDURE — A9500 TC99M SESTAMIBI: HCPCS | Performed by: NURSE PRACTITIONER

## 2019-01-29 PROCEDURE — 0 TECHNETIUM SESTAMIBI: Performed by: NURSE PRACTITIONER

## 2019-01-29 RX ADMIN — TECHNETIUM TC 99M SESTAMIBI 1 DOSE: 1 INJECTION INTRAVENOUS at 12:30

## 2019-01-29 RX ADMIN — TECHNETIUM TC 99M SESTAMIBI 1 DOSE: 1 INJECTION INTRAVENOUS at 14:15

## 2020-03-11 ENCOUNTER — OFFICE VISIT (OUTPATIENT)
Dept: FAMILY MEDICINE CLINIC | Facility: CLINIC | Age: 55
End: 2020-03-11

## 2020-03-11 VITALS
TEMPERATURE: 98 F | BODY MASS INDEX: 26.83 KG/M2 | HEART RATE: 82 BPM | WEIGHT: 177 LBS | HEIGHT: 68 IN | RESPIRATION RATE: 16 BRPM | SYSTOLIC BLOOD PRESSURE: 139 MMHG | OXYGEN SATURATION: 98 % | DIASTOLIC BLOOD PRESSURE: 68 MMHG

## 2020-03-11 DIAGNOSIS — M25.521 RIGHT ELBOW PAIN: Primary | ICD-10-CM

## 2020-03-11 PROCEDURE — 99213 OFFICE O/P EST LOW 20 MIN: CPT | Performed by: NURSE PRACTITIONER

## 2020-03-11 NOTE — PATIENT INSTRUCTIONS
Tennis Elbow  Tennis elbow is swelling (inflammation) in your outer forearm, near your elbow. Swelling affects the tissues that connect muscle to bone (tendons). Tennis elbow can happen in any sport or job in which you use your elbow too much. It is caused by doing the same motion over and over. Tennis elbow can cause:  · Pain and tenderness in your forearm and the outer part of your elbow. You may have pain all the time, or only when using the arm.  · A burning feeling. This runs from your elbow through your arm.  · Weak  in your hand.  Follow these instructions at home:  Activity  · Rest your elbow and wrist. Avoid activities that cause problems, as told by your doctor.  · If told by your doctor, wear an elbow strap to reduce stress on the area.  · Do physical therapy exercises as told.  · If you lift an object, lift it with your palm facing up. This is easier on your elbow.  Lifestyle  · If your tennis elbow is caused by sports, check your equipment and make sure that:  ? You are using it correctly.  ? It fits you well.  · If your tennis elbow is caused by work or by using a computer, take breaks often to stretch your arm. Talk with your manager about how you can manage your condition at work.  If you have a brace:  · Wear the brace as told by your doctor. Remove it only as told by your doctor.  · Loosen the brace if your fingers tingle, get numb, or turn cold and blue.  · Keep the brace clean.  · If the brace is not waterproof, ask your doctor if you may take the brace off for bathing. If you must keep the brace on while bathing:  ? Do not let it get wet.  ? Cover it with a watertight covering when you take a bath or a shower.  General instructions    · If told, put ice on the painful area:  ? Put ice in a plastic bag.  ? Place a towel between your skin and the bag.  ? Leave the ice on for 20 minutes, 2-3 times a day.  · Take over-the-counter and prescription medicines only as told by your doctor.  · Keep  all follow-up visits as told by your doctor. This is important.  Contact a doctor if:  · Your pain does not get better with treatment.  · Your pain gets worse.  · You have weakness in your forearm, hand, or fingers.  · You cannot feel your forearm, hand, or fingers.  Summary  · Tennis elbow is swelling (inflammation) in your outer forearm, near your elbow.  · Tennis elbow is caused by doing the same motion over and over.  · Rest your elbow and wrist. Avoid activities that cause problems, as told by your doctor.  · If told, put ice on the painful area for 20 minutes, 2-3 times a day.  This information is not intended to replace advice given to you by your health care provider. Make sure you discuss any questions you have with your health care provider.  Document Released: 06/07/2011 Document Revised: 09/13/2019 Document Reviewed: 10/02/2018  Elsevier Interactive Patient Education © 2020 [employer]

## 2020-03-11 NOTE — PROGRESS NOTES
Subjective   Gerhard Valencia is a 54 y.o. male.     History of Present Illness Complains of right elbow pain for months. Thinks he injured it turning a crank at work. Seen at urgent care and took steroids with short relief but symptoms are back. Pain and weakness with certain movements. 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    Outpatient Encounter Medications as of 3/11/2020   Medication Sig Dispense Refill   • diclofenac (VOLTAREN) 50 MG EC tablet Take 1 tablet by mouth 2 (Two) Times a Day. 60 tablet 2   • [DISCONTINUED] nitroglycerin (NITROSTAT) 0.4 MG SL tablet Place 1 tablet under the tongue Every 5 (Five) Minutes As Needed for Chest Pain. Take no more than 3 doses in 15 minutes. 90 tablet 0   • [DISCONTINUED] omeprazole (priLOSEC) 40 MG capsule TAKE 1 CAPSULE BY MOUTH DAILY (Patient taking differently: Take 40 mg by mouth Daily As Needed.) 90 capsule 0     No facility-administered encounter medications on file as of 3/11/2020.        The following portions of the patient's history were reviewed and updated as appropriate: allergies, current medications, past family history, past medical history, past social history, past surgical history and problem list.    Review of Systems   Constitutional: Negative for appetite change, fever and unexpected weight loss.   HENT: Negative for nosebleeds, sore throat and trouble swallowing.    Eyes: Negative for visual disturbance.   Respiratory: Negative for cough, shortness of breath and wheezing.    Cardiovascular: Negative for chest pain, palpitations and leg swelling.   Gastrointestinal: Negative for abdominal pain, blood in stool, constipation, diarrhea, nausea and vomiting.   Endocrine: Negative for polydipsia, polyphagia and polyuria.   Genitourinary: Negative for dysuria, frequency, hematuria and urinary incontinence.   Musculoskeletal: Positive for arthralgias. Negative for gait problem, joint swelling and myalgias.   Skin: Negative for rash.  "  Neurological: Negative for dizziness, seizures, syncope and numbness.   Hematological: Negative for adenopathy. Does not bruise/bleed easily.   Psychiatric/Behavioral: Negative for sleep disturbance and depressed mood. The patient is not nervous/anxious.        Objective     Visit Vitals  /68 (BP Location: Left arm, Patient Position: Sitting, Cuff Size: Adult)   Pulse 82   Temp 98 °F (36.7 °C) (Temporal)   Resp 16   Ht 172.7 cm (68\")   Wt 80.3 kg (177 lb)   SpO2 98%   BMI 26.91 kg/m²       Physical Exam   Constitutional: He is oriented to person, place, and time. He appears well-developed and well-nourished. No distress.   HENT:   Head: Normocephalic and atraumatic.   Right Ear: Tympanic membrane and external ear normal.   Left Ear: Tympanic membrane and external ear normal.   Nose: Nose normal.   Mouth/Throat: Oropharynx is clear and moist. No oropharyngeal exudate.   Eyes: Pupils are equal, round, and reactive to light. Conjunctivae are normal. Right eye exhibits no discharge. Left eye exhibits no discharge. No scleral icterus.   Neck: Neck supple. No tracheal deviation present. No thyromegaly present.   Cardiovascular: Normal rate, regular rhythm and normal heart sounds. Exam reveals no gallop and no friction rub.   No murmur heard.  Pulmonary/Chest: Effort normal and breath sounds normal. No respiratory distress. He has no wheezes.   Abdominal: Soft. Bowel sounds are normal. He exhibits no distension and no mass. There is no tenderness.   Musculoskeletal: He exhibits tenderness. He exhibits no edema or deformity.   No edema or erythema. Tender to palpation on lateral epicondyle. FROM with supination and pronation   Lymphadenopathy:     He has no cervical adenopathy.   Neurological: He is alert and oriented to person, place, and time. Coordination normal.   Skin: Skin is warm and dry. Capillary refill takes less than 2 seconds. No rash noted. No erythema.   Psychiatric: He has a normal mood and affect. " His speech is normal and behavior is normal. Judgment and thought content normal.   Nursing note and vitals reviewed.        Assessment/Plan   Gerhard was seen today for elbow pain.    Diagnoses and all orders for this visit:    Right elbow pain  -     Ambulatory Referral to Orthopedic Surgery  -     diclofenac (VOLTAREN) 50 MG EC tablet; Take 1 tablet by mouth 2 (Two) Times a Day.    Likely tendonitis/epicondylitis.  Wear compression wrap on elbow.  Take NSAIDS with caution. Watch for GI upset.  Elevate and apply ice.  Refer to Ortho.  Follow up as needed.

## 2020-08-24 NOTE — PROGRESS NOTES
PCP: Jacob Hobbs MD    Chief Complaint   Patient presents with   • New patient     second opinion   • Hernia repair     having issues with bloating; alot of gas   • Diarrhea   • Difficulty Swallowing   • Food Intolerance     breads   • Heartburn   • Abdominal Pain   • Vomiting     patient gets really tired during this time; sweats alot   • Nausea        History of Present Illness:   Gerhard Valencia is a 53 y.o. male who presents to the GI clinic for gas, abdominal pain, nocturnal diarrhea. Reports a history of GERD and hiatal hernia with what sounds like a fundoplication 20 years ago.  Now he has slowly developed worsening abdominal pain, bloat, gas and diarrhea.  Abdominal pain epigastric dull, achy, within 2 hours after eating. Associated with bloat and bowel sounds. Diarrhea may follow but is unpredictable. Sometimes wakes up in middle of night and has to rush to have a bm. Stools not always liquid. No fever, no wt loss.  He follows with Dr. Robles and reports having an egd/colonoscopy within past 18 months.      Past Medical History:   Diagnosis Date   • Bell's palsy    • Diverticulosis    • AIRAM (generalized anxiety disorder)    • GERD (gastroesophageal reflux disease)    • Hiatal hernia    • Tobacco dependence        Past Surgical History:   Procedure Laterality Date   • COLONOSCOPY  2016   • INGUINAL HERNIA REPAIR Bilateral 1966   • NEUROPLASTY / TRANSPOSITION ULNAR NERVE AT ELBOW Left 2012   • NISSEN FUNDOPLICATION  1999   • UPPER GASTROINTESTINAL ENDOSCOPY  2016         Current Outpatient Medications:   •  FLUoxetine (PROzac) 20 MG capsule, Take 20 mg by mouth Daily., Disp: , Rfl:   •  FLUoxetine (PROzac) 40 MG capsule, TK 1 C PO QAM, Disp: , Rfl: 1  •  omeprazole (priLOSEC) 40 MG capsule, TAKE 1 CAPSULE BY MOUTH DAILY, Disp: 90 capsule, Rfl: 0  •  ondansetron (ZOFRAN) 4 MG tablet, Take 1 tablet by mouth Every 8 (Eight) Hours As Needed for Nausea., Disp: 30 tablet, Rfl: 0  •  diclofenac (VOLTAREN) 75  MG EC tablet, Take 1 tablet by mouth 2 (Two) Times a Day., Disp: 60 tablet, Rfl: 0  •  metroNIDAZOLE (FLAGYL) 500 MG tablet, Take 1 tablet by mouth 3 (Three) Times a Day., Disp: 21 tablet, Rfl: 0  •  tiZANidine (ZANAFLEX) 4 MG tablet, Take 1 tab po q hs, Disp: 45 tablet, Rfl: 0    No Known Allergies    Family History   Problem Relation Age of Onset   • Diabetes Mother    • Lung cancer Father        Social History     Socioeconomic History   • Marital status:      Spouse name: Cesar   • Number of children: 0   • Years of education: H.S.   • Highest education level: Not on file   Social Needs   • Financial resource strain: Not on file   • Food insecurity - worry: Not on file   • Food insecurity - inability: Not on file   • Transportation needs - medical: Not on file   • Transportation needs - non-medical: Not on file   Occupational History   • Occupation:      Employer: [employer]   Tobacco Use   • Smoking status: Current Every Day Smoker     Packs/day: 1.00     Years: 22.00     Pack years: 22.00     Types: Cigarettes   • Smokeless tobacco: Never Used   Substance and Sexual Activity   • Alcohol use: No   • Drug use: No   • Sexual activity: Yes     Partners: Female   Other Topics Concern   • Not on file   Social History Narrative   • Not on file       Review of Systems   Constitutional: Positive for fatigue.   Eyes: Negative.    Respiratory: Negative.    Cardiovascular: Negative.    Gastrointestinal: Positive for abdominal distention, abdominal pain, diarrhea, nausea and vomiting.   Endocrine: Negative.    Genitourinary: Positive for difficulty urinating.   Musculoskeletal: Negative.    Skin: Negative.    Allergic/Immunologic: Negative.    Neurological: Positive for dizziness, light-headedness and headaches.   Hematological: Negative.    Psychiatric/Behavioral: Positive for sleep disturbance. The patient is nervous/anxious.      All other systems reviewed and are negative.    Vitals:    01/18/19 0827    BP: 128/84   Pulse: 80   Temp: 97.8 °F (36.6 °C)   SpO2: 99%       Physical Exam  General Appearance:  Vitals as above. no acute distress  Head/face:  Normocephalic, atraumatic  Eyes:   EOMI, no conjunctivitis or icterus   Nose/Sinuses:  Nares patent bilaterally without discharge or lesions  Mouth/Throat:  Posterior pharynx is pink without drainage or exudates;  dentition is in good condition and repair  Neck:  trachea is midline, no thyromegaly  Lungs:  Clear to auscultation bilaterally  Heart:  Regular rate and rhythm without murmur, gallop or rub  Abdomen:  Soft, non-tender to palpation, no obvious masses, bowel sounds positive in four quadrants; no abdominal bruits; no guarding or rebound tenderness  Neurologic:  Alert; no focal deficits; age appropriate behavior and speech  Psychiatric: mood and affect are congruent  Vascular: extremities without edema  Skin: no rash or cyanosis.      Assessment/Plan  1.) GERD, chronic  2.) s/p fundoplication, his report  3.) Intermittent diarrhea  4.) Bloat  5.) Fatigue  6.) Nausea and vomiting    His constellation of symptoms would favor functional etiology though nocturnal awakening and occasional vomiting do stand out.  I would offer endoscopy but he reports an unremarkable egd/colon within past year and follows with my partner, Dr. Robles.  I will try to treat SIBO with empiric rifaximin and start a Low Fodmap diet.  He will inevitably have some discomfort with gas due to fundoplication, but he does report ability to intermittently belch which may suggest a partially slipped wrap.  As Dr. Robles has now officially joined [facility], I believe the patient is best served by returning to his services.        Reji Gallagher MD  1/18/2019   ambulatory

## 2020-11-03 ENCOUNTER — OFFICE VISIT (OUTPATIENT)
Dept: FAMILY MEDICINE CLINIC | Facility: CLINIC | Age: 55
End: 2020-11-03

## 2020-11-03 VITALS
DIASTOLIC BLOOD PRESSURE: 82 MMHG | RESPIRATION RATE: 20 BRPM | HEIGHT: 68 IN | OXYGEN SATURATION: 99 % | SYSTOLIC BLOOD PRESSURE: 138 MMHG | HEART RATE: 91 BPM | WEIGHT: 165.8 LBS | BODY MASS INDEX: 25.13 KG/M2 | TEMPERATURE: 98.4 F

## 2020-11-03 DIAGNOSIS — G89.29 CHRONIC PAIN OF RIGHT THUMB: Primary | ICD-10-CM

## 2020-11-03 DIAGNOSIS — K59.00 CONSTIPATION, UNSPECIFIED CONSTIPATION TYPE: ICD-10-CM

## 2020-11-03 DIAGNOSIS — R20.0 NUMBNESS AND TINGLING IN RIGHT HAND: ICD-10-CM

## 2020-11-03 DIAGNOSIS — R20.2 NUMBNESS AND TINGLING IN RIGHT HAND: ICD-10-CM

## 2020-11-03 DIAGNOSIS — M79.644 CHRONIC PAIN OF RIGHT THUMB: Primary | ICD-10-CM

## 2020-11-03 DIAGNOSIS — M54.50 CHRONIC BILATERAL LOW BACK PAIN WITHOUT SCIATICA: ICD-10-CM

## 2020-11-03 DIAGNOSIS — M54.6 ACUTE MIDLINE THORACIC BACK PAIN: ICD-10-CM

## 2020-11-03 DIAGNOSIS — G89.29 CHRONIC BILATERAL LOW BACK PAIN WITHOUT SCIATICA: ICD-10-CM

## 2020-11-03 PROCEDURE — 99214 OFFICE O/P EST MOD 30 MIN: CPT | Performed by: NURSE PRACTITIONER

## 2020-11-03 RX ORDER — NAPROXEN 500 MG/1
500 TABLET ORAL 2 TIMES DAILY WITH MEALS
Qty: 60 TABLET | Refills: 2 | Status: SHIPPED | OUTPATIENT
Start: 2020-11-03 | End: 2020-11-16 | Stop reason: ALTCHOICE

## 2020-11-03 NOTE — PROGRESS NOTES
"Subjective   Gerhard Valencia is a 55 y.o. male.   Chief Complaint   Patient presents with   • Back Pain   • Arthritis   • ER follow up   • Constipation      History of Present Illness   Patient is here to follow up from ER visit on 10/30/20 for  acute thoracic back pain, constipation and unsp lumbar vertebra wedge compression fracture.   He was treated with diclofenac, steroid dose pack and laxative.     On going with pain and popping at the joint he is complaining of numbness to his thumb on the medial side.    He has been having a very small hard balls stools.       The following portions of the patient's history were reviewed and updated as appropriate: allergies, current medications, past family history, past medical history, past social history, past surgical history and problem list.    Review of Systems   Constitutional: Negative.    HENT: Negative.    Cardiovascular: Negative.    Gastrointestinal: Positive for constipation.   Genitourinary: Negative.    Musculoskeletal: Positive for arthralgias, back pain and joint swelling.   Skin: Negative.    Allergic/Immunologic: Negative.    Neurological: Negative.    Hematological: Negative.    Psychiatric/Behavioral: Negative.      Past Surgical History:   Procedure Laterality Date   • COLONOSCOPY  2016   • HIATAL HERNIA REPAIR  2004   • INGUINAL HERNIA REPAIR Bilateral 1966   • NEUROPLASTY / TRANSPOSITION ULNAR NERVE AT ELBOW Left 2012   • NISSEN FUNDOPLICATION  1999   • UPPER GASTROINTESTINAL ENDOSCOPY  2016     Past Medical History:   Diagnosis Date   • Bell's palsy    • Diverticulosis    • AIRAM (generalized anxiety disorder)    • GERD (gastroesophageal reflux disease)    • Hiatal hernia    • Tobacco dependence        Objective   No Known Allergies  Visit Vitals  /82 (BP Location: Left arm, Patient Position: Sitting, Cuff Size: Adult)   Pulse 91   Temp 98.4 °F (36.9 °C)   Resp 20   Ht 172.7 cm (68\")   Wt 75.2 kg (165 lb 12.8 oz)   SpO2 99%   BMI 25.21 kg/m² "       Physical Exam  Vitals signs reviewed.   Neck:      Musculoskeletal: Normal range of motion.   Cardiovascular:      Rate and Rhythm: Normal rate and regular rhythm.      Pulses: Normal pulses.   Pulmonary:      Effort: Pulmonary effort is normal.      Breath sounds: Normal breath sounds.   Abdominal:      General: Bowel sounds are normal. There is no distension.      Palpations: Abdomen is soft. There is no mass.      Tenderness: There is no abdominal tenderness. There is no right CVA tenderness, left CVA tenderness, guarding or rebound.      Hernia: No hernia is present.   Musculoskeletal:      Thoracic back: He exhibits pain.      Lumbar back: He exhibits tenderness, pain and spasm.      Comments: Right hand thumb had joint swelling.    Skin:     Capillary Refill: Capillary refill takes less than 2 seconds.   Neurological:      Mental Status: He is alert and oriented to person, place, and time.   Psychiatric:         Mood and Affect: Mood normal.         Behavior: Behavior normal.         Thought Content: Thought content normal.         Assessment/Plan   Diagnoses and all orders for this visit:    1. Chronic pain of right thumb (Primary)  -     Ambulatory Referral to Orthopedic Surgery  -     naproxen (Naprosyn) 500 MG tablet; Take 1 tablet by mouth 2 (Two) Times a Day With Meals.  Dispense: 60 tablet; Refill: 2    2. Numbness and tingling in right hand  -     Ambulatory Referral to Orthopedic Surgery    3. Chronic bilateral low back pain without sciatica  -     Ambulatory Referral to Neurosurgery    4. Constipation, unspecified constipation type  -     magnesium hydroxide (Milk of Magnesia) 400 MG/5ML suspension; Take 15 mL by mouth Daily As Needed for Constipation for up to 10 days.  Dispense: 354 mL; Refill: 2    5. Acute midline thoracic back pain    referral to both neurosurgery and orthopedic   Milk of mag for LOC   Continue medications as ordered.   See constipation and back pain patient instructions           Patient Instructions   Constipation, Adult  Constipation is when a person has fewer bowel movements in a week than normal, has difficulty having a bowel movement, or has stools that are dry, hard, or larger than normal. Constipation may be caused by an underlying condition. It may become worse with age if a person takes certain medicines and does not take in enough fluids.  Follow these instructions at home:  Eating and drinking    · Eat foods that have a lot of fiber, such as fresh fruits and vegetables, whole grains, and beans.  · Limit foods that are high in fat, low in fiber, or overly processed, such as french fries, hamburgers, cookies, candies, and soda.  · Drink enough fluid to keep your urine clear or pale yellow.  General instructions  · Exercise regularly or as told by your health care provider.  · Go to the restroom when you have the urge to go. Do not hold it in.  · Take over-the-counter and prescription medicines only as told by your health care provider. These include any fiber supplements.  · Practice pelvic floor retraining exercises, such as deep breathing while relaxing the lower abdomen and pelvic floor relaxation during bowel movements.  · Watch your condition for any changes.  · Keep all follow-up visits as told by your health care provider. This is important.  Contact a health care provider if:  · You have pain that gets worse.  · You have a fever.  · You do not have a bowel movement after 4 days.  · You vomit.  · You are not hungry.  · You lose weight.  · You are bleeding from the anus.  · You have thin, pencil-like stools.  Get help right away if:  · You have a fever and your symptoms suddenly get worse.  · You leak stool or have blood in your stool.  · Your abdomen is bloated.  · You have severe pain in your abdomen.  · You feel dizzy or you faint.  This information is not intended to replace advice given to you by your health care provider. Make sure you discuss any questions you  have with your health care provider.  Document Released: 09/15/2005 Document Revised: 11/30/2018 Document Reviewed: 06/07/2017  Elsevier Patient Education © 2020 [employer] Inc[employer]        Chante Hester, APRN

## 2020-11-10 ENCOUNTER — OFFICE VISIT (OUTPATIENT)
Dept: ORTHOPEDIC SURGERY | Facility: CLINIC | Age: 55
End: 2020-11-10

## 2020-11-10 VITALS — HEIGHT: 68 IN | HEART RATE: 83 BPM | WEIGHT: 165.79 LBS | BODY MASS INDEX: 25.13 KG/M2 | OXYGEN SATURATION: 98 %

## 2020-11-10 DIAGNOSIS — M79.644 THUMB PAIN, RIGHT: ICD-10-CM

## 2020-11-10 DIAGNOSIS — M79.641 HAND PAIN, RIGHT: ICD-10-CM

## 2020-11-10 DIAGNOSIS — M65.311 TRIGGER FINGER OF RIGHT THUMB: Primary | ICD-10-CM

## 2020-11-10 PROCEDURE — 99204 OFFICE O/P NEW MOD 45 MIN: CPT | Performed by: ORTHOPAEDIC SURGERY

## 2020-11-10 NOTE — PROGRESS NOTES
[facility] Note        Subjective     Pain of the Right Hand      HPI    Gerhard Valencia is a 55 y.o. male who presents with new problem of: right hand pain.  Onset: atraumatic and gradual in nature. The issue has been ongoing for 3 month(s). Pain is a 9/10 on the pain scale in the mornings. Pain is described as burning. Associated symptoms include pain, popping and numbness. The pain is worse with working and writing, first waking up in the morning; repositioning improve the pain. Previous treatments have included: bracing and oral steroids.    I have reviewed the following portions of the patient's history:History of Present Illness and review of systems.          Past Medical History:   Diagnosis Date   • Bell's palsy    • Diverticulosis    • AIRAM (generalized anxiety disorder)    • GERD (gastroesophageal reflux disease)    • Hiatal hernia    • Tobacco dependence       Past Surgical History:   Procedure Laterality Date   • COLONOSCOPY  2016   • HIATAL HERNIA REPAIR  2004   • INGUINAL HERNIA REPAIR Bilateral 1966   • NEUROPLASTY / TRANSPOSITION ULNAR NERVE AT ELBOW Left 2012   • NISSEN FUNDOPLICATION  1999   • UPPER GASTROINTESTINAL ENDOSCOPY  2016      Family History   Problem Relation Age of Onset   • Diabetes Mother    • Lung cancer Father    • Diabetes Brother    • Diabetes Maternal Grandmother    • Heart disease Maternal Grandmother    • No Known Problems Maternal Grandfather    • No Known Problems Paternal Grandmother    • No Known Problems Paternal Grandfather    • Heart disease Maternal Aunt      Social History     Socioeconomic History   • Marital status:      Spouse name: Cesar   • Number of children: 0   • Years of education: H.S.   • Highest education level: Not on file   Occupational History   • Occupation:      Employer: [employer]   Social Needs   • Financial resource strain: Not hard at all   • Food insecurity     Worry: Not on file     Inability: Never true    • Transportation needs     Medical: No     Non-medical: No   Tobacco Use   • Smoking status: Current Every Day Smoker     Packs/day: 1.00     Years: 22.00     Pack years: 22.00     Types: Cigarettes   • Smokeless tobacco: Never Used   Substance and Sexual Activity   • Alcohol use: Yes     Comment: rarely   • Drug use: No   • Sexual activity: Defer   Lifestyle   • Physical activity     Days per week: 0 days     Minutes per session: 0 min   • Stress: Not at all   Relationships   • Social connections     Talks on phone: More than three times a week     Gets together: Twice a week     Attends Taoism service: Never     Active member of club or organization: No     Attends meetings of clubs or organizations: Never     Relationship status:    Social History Narrative    Caffeine Intake:4-5  servings per day    Patient lives at home with is wife      Current Outpatient Medications on File Prior to Visit   Medication Sig Dispense Refill   • magnesium hydroxide (Milk of Magnesia) 400 MG/5ML suspension Take 15 mL by mouth Daily As Needed for Constipation for up to 10 days. 354 mL 2   • naproxen (Naprosyn) 500 MG tablet Take 1 tablet by mouth 2 (Two) Times a Day With Meals. 60 tablet 2   • [DISCONTINUED] methylPREDNISolone (MEDROL) 4 MG dose pack Take as directed on package instructions. 1 each 0     No current facility-administered medications on file prior to visit.       No Known Allergies       Review of Systems   Constitutional: Negative.    HENT: Negative.    Eyes: Negative.    Respiratory: Negative.    Cardiovascular: Negative.    Gastrointestinal: Negative.    Endocrine: Negative.    Genitourinary: Negative.    Musculoskeletal: Positive for arthralgias.   Skin: Negative.    Allergic/Immunologic: Negative.    Neurological: Negative.    Hematological: Negative.    Psychiatric/Behavioral: Negative.         I reviewed the patient's chief complaint, history of present illness, review of systems, past medical  "history, surgical history, family history, social history, medications and allergy list.        Objective      Physical Exam  Pulse 83   Ht 172.7 cm (67.99\")   Wt 75.2 kg (165 lb 12.6 oz)   SpO2 98%   BMI 25.21 kg/m²     Body mass index is 25.21 kg/m².    General  Mental Status - alert  General Appearance - cooperative, well groomed, not in acute distress  Orientation - Oriented X3  Build & Nutrition - well developed and well nourished  Posture - normal posture  Gait - normal gait     Integumentary  Global Assessment  Examination of related systems reveals - no lymphadenopathy  Ears:  No abnormality  Nose:  No mucous drainage  General Characteristics  Overall examination of the patient's skin reveals - no rashes, no evidence of scars, no suspicious lesions and no bruises.  Color - normal coloration of skin.  Vascular: Brisk capillary refill in all extremities    Ortho Exam  Right thumb: Negative CMC shuck and grind.  Negative Finkelstein's.  He is tender to palpation over the thumb A1 pulley.  There is reproducible locking and catching.    Imaging/Studies  Imaging Results (Last 24 Hours)     Procedure Component Value Units Date/Time    XR Hand 3+ View Right [[id number]] Resulted: 11/10/20 1657     Updated: 11/10/20 1658    Narrative:      Right Hand X-Ray    Indication: Pain    Views:  AP, Lateral, and Oblique     Comparison: None    Findings:  No fracture  No bony lesion  Normal soft tissues  Mild degenerative changes are seen throughout the IP joints    Impression:   Negative right hand x-ray for acute bony abnormality                Assessment    Assessment:  1. Trigger finger of right thumb    2. Hand pain, right    3. Thumb pain, right        Plan:  1. Continue over-the-counter medication as needed for discomfort  2. Right trigger thumb--we discussed treatment options and alternatives with the patient.  He is not interested in an injection secondary to a phobia of needles.  Furthermore he is more interested " in definitive management.  We discussed the risk, benefits, potential hazards of right trigger thumb release.  Patient had the opportunity to ask questions and wishes to proceed with scheduling.  We will get this done as an outpatient in the near future.        Gavin Garrett MD  11/10/20  17:27 BOO Meyer disclaimer:  Much of this encounter note is an electronic transcription/translation of spoken language to printed text. The electronic translation of spoken language may permit erroneous, or at times, nonsensical words or phrases to be inadvertently transcribed; Although I have reviewed the note for such errors, some may still exist.

## 2020-11-16 ENCOUNTER — OFFICE VISIT (OUTPATIENT)
Dept: NEUROSURGERY | Facility: CLINIC | Age: 55
End: 2020-11-16

## 2020-11-16 VITALS — BODY MASS INDEX: 24.86 KG/M2 | HEIGHT: 68 IN | WEIGHT: 164 LBS

## 2020-11-16 DIAGNOSIS — G89.29 CHRONIC NECK AND BACK PAIN: Primary | ICD-10-CM

## 2020-11-16 DIAGNOSIS — M54.9 CHRONIC NECK AND BACK PAIN: Primary | ICD-10-CM

## 2020-11-16 DIAGNOSIS — M54.2 CHRONIC NECK AND BACK PAIN: Primary | ICD-10-CM

## 2020-11-16 PROCEDURE — 99204 OFFICE O/P NEW MOD 45 MIN: CPT | Performed by: PHYSICIAN ASSISTANT

## 2020-11-16 RX ORDER — METHOCARBAMOL 750 MG/1
750 TABLET, FILM COATED ORAL 2 TIMES DAILY PRN
Qty: 60 TABLET | Refills: 1 | Status: SHIPPED | OUTPATIENT
Start: 2020-11-16 | End: 2020-12-08

## 2020-11-16 RX ORDER — MELOXICAM 15 MG/1
15 TABLET ORAL DAILY
Qty: 60 TABLET | Refills: 2 | Status: SHIPPED | OUTPATIENT
Start: 2020-11-16 | End: 2021-10-09

## 2020-11-16 NOTE — PROGRESS NOTES
Subjective     Chief Complaint: chronic low back pain     Patient ID: Gerhard Valencia is a 55 y.o. male seen for consultation today at the request of  [facility]    History of Present Illness    This is a 55-year-old gentleman who works as a  with a medical history significant for active tobacco and anxiety.  He is seen today in consultation for low back pain.  He reports longstanding history of chronic back pain with acute exacerbations.  His most recent exacerbation occurred 3 weeks ago when he was moving a large container at work.  He describes sudden onset of back spasms.  Seen in urgent care and given steroids.  Symptoms have significantly improved since that time.  He reports that in the past when he has flareups of back pain he feels some mild weakness in his left ankle with ambulation.  However, at this time he is not having any weakness and ambulates without difficulty.  He is currently complaining of ongoing low back pain and some tightness in his neck.  He denies any radiating pain in upper or lower extremities.  Denies numbness/tingling, weakness, saddle anesthesia, or bowel or bladder dysfunction.  He is not currently taking any medications for his symptoms.    The following portions of the patient's history were reviewed and updated as appropriate: allergies, current medications, past family history, past medical history, past social history, past surgical history and problem list.    Family history:   Family History   Problem Relation Age of Onset   • Diabetes Mother    • Lung cancer Father    • Diabetes Brother    • Diabetes Maternal Grandmother    • Heart disease Maternal Grandmother    • No Known Problems Maternal Grandfather    • No Known Problems Paternal Grandmother    • No Known Problems Paternal Grandfather    • Heart disease Maternal Aunt        Social history:   Social History     Socioeconomic History   • Marital status:      Spouse name: Cesar   • Number of  "children: 0   • Years of education: H.S.   • Highest education level: Not on file   Occupational History   • Occupation:      Employer: [employer]   Social Needs   • Financial resource strain: Not hard at all   • Food insecurity     Worry: Not on file     Inability: Never true   • Transportation needs     Medical: No     Non-medical: No   Tobacco Use   • Smoking status: Current Every Day Smoker     Packs/day: 1.00     Years: 22.00     Pack years: 22.00     Types: Cigarettes   • Smokeless tobacco: Never Used   Substance and Sexual Activity   • Alcohol use: Yes     Comment: rarely   • Drug use: No   • Sexual activity: Defer   Lifestyle   • Physical activity     Days per week: 0 days     Minutes per session: 0 min   • Stress: Not at all   Relationships   • Social connections     Talks on phone: More than three times a week     Gets together: Twice a week     Attends Latter-day service: Never     Active member of club or organization: No     Attends meetings of clubs or organizations: Never     Relationship status:    Social History Narrative    Caffeine Intake:4-5  servings per day    Patient lives at home with is wife       Review of Systems   All other systems reviewed and are negative.      Objective   Height 172.7 cm (67.99\"), weight 74.4 kg (164 lb).  Body mass index is 24.94 kg/m².  Patient's Body mass index is 24.94 kg/m². BMI is within normal parameters. No follow-up required..      Physical Exam  Constitutional:       General: He is not in acute distress.     Appearance: Normal appearance. He is normal weight. He is not ill-appearing, toxic-appearing or diaphoretic.   HENT:      Head: Normocephalic and atraumatic.   Eyes:      Extraocular Movements: Extraocular movements intact.      Pupils: Pupils are equal, round, and reactive to light.   Neck:      Musculoskeletal: Decreased range of motion. Muscular tenderness present. No neck rigidity, pain with movement or spinous process tenderness. "   Musculoskeletal:      Cervical back: He exhibits no bony tenderness and no deformity.      Lumbar back: He exhibits pain and spasm. He exhibits normal range of motion and no bony tenderness.   Neurological:      Mental Status: He is alert.      GCS: GCS eye subscore is 4. GCS verbal subscore is 5. GCS motor subscore is 6.      Sensory: Sensation is intact.      Motor: Motor function is intact.      Coordination: Romberg sign negative.      Gait: Gait is intact. Tandem walk normal.      Deep Tendon Reflexes:      Reflex Scores:       Tricep reflexes are 2+ on the right side and 2+ on the left side.       Bicep reflexes are 2+ on the right side and 2+ on the left side.       Brachioradialis reflexes are 2+ on the right side and 2+ on the left side.       Patellar reflexes are 2+ on the right side and 2+ on the left side.       Achilles reflexes are 2+ on the right side and 2+ on the left side.     Comments: Casual gait is normal.  Performs tandem, heel and toe gait without difficulty.  Negative Romberg sign.  Strength is intact to direct testing in both upper and lower extremities and symmetric with the exception of a very faint weakness detected with left ankle dorsiflexion.  Sensation is intact to light touch testing and symmetric throughout.  Clonus is absent.  Ed sign is absent.       Lumbar and thoracic x-rays (10/30/2020): Mild anterior wedging of L1 vertebral body age-indeterminate.  Multilevel lumbar spondylosis    Assessment/Plan     This is a 55-year-old gentleman who works as a  with chronic neck and low back pain.  He had an acute exacerbation of his low back pain 3 weeks ago when moving a large item at work.  He was noted to have a mild anterior wedging of an L1 vertebral body on x-ray.  He does not have any bony tenderness palpation on exam today, this may likely represent an old fracture.  Additionally, his symptoms are improving and back to baseline at this time.  I have referred  him to physical therapy and pain management.  I have also provided him with a prescription for Mobic and Robaxin.  If his symptoms persist despite conservative therapies, we will proceed with obtaining a lumbar MRI.  I reviewed signs and symptoms of lumbosacral and cervical radiculopathy/myelopathy and instructed him to call our office with any new or worsening symptoms.  I will be happy to follow up with him after he has completed PT/PM, or sooner if clinically indicated.    Diagnoses and all orders for this visit:    1. Chronic neck and back pain (Primary)  -     Ambulatory Referral to Physical Therapy Evaluate and treat  -     Ambulatory Referral to Pain Management    Other orders  -     meloxicam (MOBIC) 15 MG tablet; Take 1 tablet by mouth Daily.  Dispense: 60 tablet; Refill: 2  -     methocarbamol (ROBAXIN) 750 MG tablet; Take 1 tablet by mouth 2 (Two) Times a Day As Needed for Muscle Spasms.  Dispense: 60 tablet; Refill: 1    Gerhard Valencia  reports that he has been smoking cigarettes. He has a 22.00 pack-year smoking history. He has never used smokeless tobacco.. I have educated him on the risk of diseases from using tobacco products such as cancer, COPD and heart disease.     I advised him to quit and he is not willing to quit.    I spent 3.5 minutes counseling the patient.           Return in about 3 months (around 2/16/2021), or if symptoms worsen or fail to improve.             Pamela Gonzalez PA-C

## 2020-12-06 PROBLEM — M47.816 SPONDYLOSIS OF LUMBAR REGION WITHOUT MYELOPATHY OR RADICULOPATHY: Status: ACTIVE | Noted: 2020-12-06

## 2020-12-08 ENCOUNTER — OFFICE VISIT (OUTPATIENT)
Dept: FAMILY MEDICINE CLINIC | Facility: CLINIC | Age: 55
End: 2020-12-08

## 2020-12-08 VITALS
RESPIRATION RATE: 14 BRPM | OXYGEN SATURATION: 98 % | BODY MASS INDEX: 25.16 KG/M2 | HEIGHT: 68 IN | DIASTOLIC BLOOD PRESSURE: 86 MMHG | HEART RATE: 77 BPM | TEMPERATURE: 98 F | WEIGHT: 166 LBS | SYSTOLIC BLOOD PRESSURE: 148 MMHG

## 2020-12-08 DIAGNOSIS — Z12.5 SCREENING FOR PROSTATE CANCER: ICD-10-CM

## 2020-12-08 DIAGNOSIS — Z00.00 WELL ADULT EXAM: Primary | ICD-10-CM

## 2020-12-08 DIAGNOSIS — F41.1 GAD (GENERALIZED ANXIETY DISORDER): ICD-10-CM

## 2020-12-08 PROCEDURE — G0103 PSA SCREENING: HCPCS | Performed by: FAMILY MEDICINE

## 2020-12-08 PROCEDURE — 99396 PREV VISIT EST AGE 40-64: CPT | Performed by: FAMILY MEDICINE

## 2020-12-08 PROCEDURE — 99213 OFFICE O/P EST LOW 20 MIN: CPT | Performed by: FAMILY MEDICINE

## 2020-12-08 PROCEDURE — 80061 LIPID PANEL: CPT | Performed by: FAMILY MEDICINE

## 2020-12-08 PROCEDURE — 80053 COMPREHEN METABOLIC PANEL: CPT | Performed by: FAMILY MEDICINE

## 2020-12-08 PROCEDURE — 84443 ASSAY THYROID STIM HORMONE: CPT | Performed by: FAMILY MEDICINE

## 2020-12-08 PROCEDURE — 83036 HEMOGLOBIN GLYCOSYLATED A1C: CPT | Performed by: FAMILY MEDICINE

## 2020-12-08 PROCEDURE — 85025 COMPLETE CBC W/AUTO DIFF WBC: CPT | Performed by: FAMILY MEDICINE
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
## 2020-12-08 NOTE — ASSESSMENT & PLAN NOTE
Psychological condition is worsening.  Regular aerobic exercise.  Medication changes per orders.  Referral to psychological counseling.  Psychological condition  will be reassessed 8 weeks.

## 2020-12-09 DIAGNOSIS — E78.2 MIXED HYPERLIPIDEMIA: Primary | ICD-10-CM

## 2020-12-09 LAB
ALBUMIN SERPL-MCNC: 4.7 G/DL (ref 3.5–5.2)
ALBUMIN/GLOB SERPL: 2 G/DL
ALP SERPL-CCNC: 84 U/L (ref 39–117)
ALT SERPL W P-5'-P-CCNC: 10 U/L (ref 1–41)
ANION GAP SERPL CALCULATED.3IONS-SCNC: 9.5 MMOL/L (ref 5–15)
AST SERPL-CCNC: 17 U/L (ref 1–40)
BASOPHILS # BLD AUTO: 0.06 10*3/MM3 (ref 0–0.2)
BASOPHILS NFR BLD AUTO: 0.9 % (ref 0–1.5)
BILIRUB SERPL-MCNC: 0.2 MG/DL (ref 0–1.2)
BUN SERPL-MCNC: 11 MG/DL (ref 6–20)
BUN/CREAT SERPL: 13.9 (ref 7–25)
CALCIUM SPEC-SCNC: 9.6 MG/DL (ref 8.6–10.5)
CHLORIDE SERPL-SCNC: 103 MMOL/L (ref 98–107)
CHOLEST SERPL-MCNC: 214 MG/DL (ref 0–200)
CO2 SERPL-SCNC: 28.5 MMOL/L (ref 22–29)
CREAT SERPL-MCNC: 0.79 MG/DL (ref 0.76–1.27)
DEPRECATED RDW RBC AUTO: 45.4 FL (ref 37–54)
EOSINOPHIL # BLD AUTO: 0.2 10*3/MM3 (ref 0–0.4)
EOSINOPHIL NFR BLD AUTO: 2.9 % (ref 0.3–6.2)
ERYTHROCYTE [DISTWIDTH] IN BLOOD BY AUTOMATED COUNT: 13.2 % (ref 12.3–15.4)
GFR SERPL CREATININE-BSD FRML MDRD: 102 ML/MIN/1.73
GLOBULIN UR ELPH-MCNC: 2.3 GM/DL
GLUCOSE SERPL-MCNC: 82 MG/DL (ref 65–99)
HBA1C MFR BLD: 5.5 % (ref 4.8–5.6)
HCT VFR BLD AUTO: 48.9 % (ref 37.5–51)
HDLC SERPL-MCNC: 49 MG/DL (ref 40–60)
HGB BLD-MCNC: 16.1 G/DL (ref 13–17.7)
IMM GRANULOCYTES # BLD AUTO: 0.02 10*3/MM3 (ref 0–0.05)
IMM GRANULOCYTES NFR BLD AUTO: 0.3 % (ref 0–0.5)
LDLC SERPL CALC-MCNC: 142 MG/DL (ref 0–100)
LDLC/HDLC SERPL: 2.84 {RATIO}
LYMPHOCYTES # BLD AUTO: 2.3 10*3/MM3 (ref 0.7–3.1)
LYMPHOCYTES NFR BLD AUTO: 33.7 % (ref 19.6–45.3)
MCH RBC QN AUTO: 30.3 PG (ref 26.6–33)
MCHC RBC AUTO-ENTMCNC: 32.9 G/DL (ref 31.5–35.7)
MCV RBC AUTO: 92.1 FL (ref 79–97)
MONOCYTES # BLD AUTO: 0.65 10*3/MM3 (ref 0.1–0.9)
MONOCYTES NFR BLD AUTO: 9.5 % (ref 5–12)
NEUTROPHILS NFR BLD AUTO: 3.59 10*3/MM3 (ref 1.7–7)
NEUTROPHILS NFR BLD AUTO: 52.7 % (ref 42.7–76)
NRBC BLD AUTO-RTO: 0 /100 WBC (ref 0–0.2)
PLATELET # BLD AUTO: 292 10*3/MM3 (ref 140–450)
PMV BLD AUTO: 12.8 FL (ref 6–12)
POTASSIUM SERPL-SCNC: 4.1 MMOL/L (ref 3.5–5.2)
PROT SERPL-MCNC: 7 G/DL (ref 6–8.5)
PSA SERPL-MCNC: 0.91 NG/ML (ref 0–4)
RBC # BLD AUTO: 5.31 10*6/MM3 (ref 4.14–5.8)
SODIUM SERPL-SCNC: 141 MMOL/L (ref 136–145)
TRIGL SERPL-MCNC: 129 MG/DL (ref 0–150)
TSH SERPL DL<=0.05 MIU/L-ACNC: 1.56 UIU/ML (ref 0.27–4.2)
VLDLC SERPL-MCNC: 23 MG/DL (ref 5–40)
WBC # BLD AUTO: 6.82 10*3/MM3 (ref 3.4–10.8)

## 2020-12-09 RX ORDER — ATORVASTATIN CALCIUM 40 MG/1
40 TABLET, FILM COATED ORAL NIGHTLY
Qty: 90 TABLET | Refills: 1 | Status: SHIPPED | OUTPATIENT
Start: 2020-12-09 | End: 2021-02-09 | Stop reason: SDDI

## 2020-12-10 ENCOUNTER — OFFICE VISIT (OUTPATIENT)
Dept: PAIN MEDICINE | Facility: CLINIC | Age: 55
End: 2020-12-10

## 2020-12-10 ENCOUNTER — HOSPITAL ENCOUNTER (OUTPATIENT)
Dept: GENERAL RADIOLOGY | Facility: HOSPITAL | Age: 55
Discharge: HOME | End: 2020-12-10
Admitting: ANESTHESIOLOGY

## 2020-12-10 VITALS — WEIGHT: 163 LBS | HEIGHT: 68 IN | BODY MASS INDEX: 24.71 KG/M2

## 2020-12-10 DIAGNOSIS — F41.1 GAD (GENERALIZED ANXIETY DISORDER): ICD-10-CM

## 2020-12-10 DIAGNOSIS — M47.816 SPONDYLOSIS OF LUMBAR REGION WITHOUT MYELOPATHY OR RADICULOPATHY: Primary | ICD-10-CM

## 2020-12-10 DIAGNOSIS — M47.816 SPONDYLOSIS OF LUMBAR REGION WITHOUT MYELOPATHY OR RADICULOPATHY: ICD-10-CM

## 2020-12-10 DIAGNOSIS — F17.200 CURRENT EVERY DAY SMOKER: ICD-10-CM

## 2020-12-10 DIAGNOSIS — Z71.6 TOBACCO ABUSE COUNSELING: ICD-10-CM

## 2020-12-10 PROCEDURE — 72114 X-RAY EXAM L-S SPINE BENDING: CPT

## 2020-12-10 PROCEDURE — 72070 X-RAY EXAM THORAC SPINE 2VWS: CPT

## 2020-12-10 PROCEDURE — 99204 OFFICE O/P NEW MOD 45 MIN: CPT | Performed by: ANESTHESIOLOGY

## 2020-12-10 RX ORDER — DICLOFENAC EPOLAMINE 0.01 G/1
1 SYSTEM TOPICAL 2 TIMES DAILY
Qty: 30 PATCH | Refills: 0 | Status: SHIPPED | OUTPATIENT
Start: 2020-12-10 | End: 2021-10-09

## 2020-12-10 RX ORDER — NICOTINE 21 MG/24HR
1 PATCH, TRANSDERMAL 24 HOURS TRANSDERMAL EVERY 24 HOURS
Qty: 28 PATCH | Refills: 0 | Status: SHIPPED | OUTPATIENT
Start: 2020-12-10 | End: 2021-10-09

## 2020-12-10 RX ORDER — NICOTINE 21 MG/24HR
1 PATCH, TRANSDERMAL 24 HOURS TRANSDERMAL EVERY 24 HOURS
Qty: 14 PATCH | Refills: 0 | Status: SHIPPED | OUTPATIENT
Start: 2020-12-10 | End: 2021-10-09

## 2021-01-05 ENCOUNTER — APPOINTMENT (OUTPATIENT)
Dept: PREADMISSION TESTING | Facility: HOSPITAL | Age: 56
End: 2021-01-05

## 2021-01-05 PROCEDURE — U0004 COV-19 TEST NON-CDC HGH THRU: HCPCS

## 2021-01-05 PROCEDURE — C9803 HOPD COVID-19 SPEC COLLECT: HCPCS

## 2021-01-06 LAB — SARS-COV-2 RNA RESP QL NAA+PROBE: NOT DETECTED

## 2021-01-08 ENCOUNTER — OUTSIDE FACILITY SERVICE (OUTPATIENT)
Dept: ORTHOPEDIC SURGERY | Facility: CLINIC | Age: 56
End: 2021-01-08

## 2021-01-08 DIAGNOSIS — M65.311 TRIGGER FINGER OF RIGHT THUMB: Primary | ICD-10-CM

## 2021-01-08 PROCEDURE — 26055 INCISE FINGER TENDON SHEATH: CPT | Performed by: ORTHOPAEDIC SURGERY

## 2021-01-08 RX ORDER — HYDROCODONE BITARTRATE AND ACETAMINOPHEN 5; 325 MG/1; MG/1
1 TABLET ORAL EVERY 6 HOURS PRN
Qty: 10 TABLET | Refills: 0 | Status: SHIPPED | OUTPATIENT
Start: 2021-01-08 | End: 2021-01-28

## 2021-01-11 ENCOUNTER — TELEPHONE (OUTPATIENT)
Dept: ORTHOPEDICS | Facility: OTHER | Age: 56
End: 2021-01-11

## 2021-01-11 NOTE — TELEPHONE ENCOUNTER
PATIENT CALLED REGARDING HIS BACK TO WORK STATUS. HE CLAIMS POST OP SX PAPERWORK DOES NOT SPECIFY WHEN HE IS GOING  BACK TO WORK.    PHONE: [phone]

## 2021-01-11 NOTE — TELEPHONE ENCOUNTER
Spoke with Laverne who is working on [employer] papers and she reports that Angélica states pt should be off work until follow up appt on 1/28/21.     I called pt to let him know this and told him his paperwork should be complete tomorrow when JRT is in the office to sign them. He is requesting we call him to let him know when the paperwork is ready and he will pick-up.    Edward

## 2021-01-13 ENCOUNTER — TELEPHONE (OUTPATIENT)
Dept: ORTHOPEDIC SURGERY | Facility: CLINIC | Age: 56
End: 2021-01-13

## 2021-01-13 NOTE — TELEPHONE ENCOUNTER
Caller: STEPHEN    Relationship: SELF    Best call back number: [phone]    What form or medical record are you requesting: LETTER STATING PT CANT RETURN TO WORK TIL AFTER 1/28. FOR SHORT TERM DISABILITY.     Who is requesting this form or medical record from you: WORK    How would you like to receive the form or medical records (pick-up, mail, fax): PICKUP  If pick-up, provide patient with address and location details    Timeframe paperwork needed: ASAP    Additional notes: PT STATES HE NEEDED A LETTER TO  IN OFFICE STATING HE CANT RETURN [employer] TO WORK UNTIL AFTER 1/28. PT ALSO STATES HE NEEDED AN UPDATE ON WHEN HIS FMLA PAPERS WILL BE READY FOR HIM AS WELL.

## 2021-01-14 NOTE — TELEPHONE ENCOUNTER
Fax number given by patient is not working after multiple attempts. Patient will come  a copy of his [place] paperwork and letter from clinic.

## 2021-01-14 NOTE — TELEPHONE ENCOUNTER
SPOKE TO PATIENT TO CONFIRM THAT LETTER HAS BEEN COMPLETED AND PLACED IN PATIENT'S CHART AND FMLA PAPERWORK HAS BEEN COMPLETED BUT THE FAX NUMBER WE WERE GIVEN ([phone]) IS NOT WORKING. PATIENT IS GOING TO CALL BACK WITH A DIFFERENT FAX NUMBER.

## 2021-01-22 ENCOUNTER — APPOINTMENT (OUTPATIENT)
Dept: PREADMISSION TESTING | Facility: HOSPITAL | Age: 56
End: 2021-01-22

## 2021-01-28 ENCOUNTER — OFFICE VISIT (OUTPATIENT)
Dept: ORTHOPEDIC SURGERY | Facility: CLINIC | Age: 56
End: 2021-01-28

## 2021-01-28 DIAGNOSIS — M65.311 TRIGGER FINGER OF RIGHT THUMB: ICD-10-CM

## 2021-01-28 DIAGNOSIS — Z98.890 STATUS POST TRIGGER FINGER RELEASE: Primary | ICD-10-CM

## 2021-01-28 PROCEDURE — 99024 POSTOP FOLLOW-UP VISIT: CPT | Performed by: PHYSICIAN ASSISTANT

## 2021-01-28 NOTE — PROGRESS NOTES
[facility] Note        Subjective     Post-op (3 weeks s/p trigger thumb release 1/8/21)       MAYUR Valencia is a 55 y.o. male. Patient presents for their initial postop visit following right thumb A1 pulley trigger release performed on the above date by Dr. Garrett.    Patient currently endorses a pain scale of 3/10. He denies any further clicking or triggering since having surgery. No reported numbness or tingling.    Patient denies any fever, chills, night sweats or other constitutional symptoms.        Objective      Physical Exam  There were no vitals taken for this visit.    There is no height or weight on file to calculate BMI.        Ortho Exam    Right Upper Extremity:      Musculoskeletal     Inspection and Palpation:      Thumb:      Tenderness -mild incisional    Swelling - minimal     ROM:  Slightly diminished but within normal limits    Motor: Grossly intact radial, ulnar, median, AIN, PIN.    Sensory: Grossly intact to light touch radial, ulnar, median nerve distributions.    Vascular: 2+ radial pulse with brisk capillary refill noted and each digit.       Incision: Healing appropriately with sutures in place. No redness, warmth, drainage or evidence of infection.      Imaging Reviewed:  No new imaging today.      Assessment:  1. Status post trigger finger release    2. Trigger finger of right thumb        Plan:  1. Status post right thumb A1 pulley/trigger digit release, stable and doing well.  2. Sutures were removed today.  3. Encourage gentle range of motion.  4. To avoid any strenuous gripping, twisting or lifting greater than 4 lbs.  5. Because of his work, if he is unable to be 100%, he is unable to return. Patient was given a note to be off work until his next orthopedic appointment in 2 weeks. At that point anticipating being able to return to work 100%.  6. Follow-up in 2 weeks for repeat evaluation.  7. Questions and concerns answered.      Angélica DAVIS  NATALIA Dorado  01/28/21  10:00 EST      Dragon disclaimer:  Much of this encounter note is an electronic transcription/translation of spoken language to printed text. The electronic translation of spoken language may permit erroneous, or at times, nonsensical words or phrases to be inadvertently transcribed; Although I have reviewed the note for such errors, some may still exist.   Consent (Lip)/Introductory Paragraph: The rationale for Mohs was explained to the patient and consent was obtained. The risks, benefits and alternatives to therapy were discussed in detail. Specifically, the risks of lip deformity, changes in the oral aperture, infection, scarring, bleeding, prolonged wound healing, incomplete removal, allergy to anesthesia, nerve injury and recurrence were addressed. Prior to the procedure, the treatment site was clearly identified and confirmed by the patient. All components of Universal Protocol/PAUSE Rule completed.

## 2021-02-09 ENCOUNTER — OFFICE VISIT (OUTPATIENT)
Dept: FAMILY MEDICINE CLINIC | Facility: CLINIC | Age: 56
End: 2021-02-09

## 2021-02-09 VITALS
TEMPERATURE: 97.5 F | HEART RATE: 92 BPM | WEIGHT: 170 LBS | BODY MASS INDEX: 25.76 KG/M2 | HEIGHT: 68 IN | OXYGEN SATURATION: 97 % | SYSTOLIC BLOOD PRESSURE: 128 MMHG | RESPIRATION RATE: 14 BRPM | DIASTOLIC BLOOD PRESSURE: 86 MMHG

## 2021-02-09 DIAGNOSIS — R03.0 ELEVATED BLOOD PRESSURE READING IN OFFICE WITHOUT DIAGNOSIS OF HYPERTENSION: ICD-10-CM

## 2021-02-09 DIAGNOSIS — F41.1 GAD (GENERALIZED ANXIETY DISORDER): Primary | ICD-10-CM

## 2021-02-09 DIAGNOSIS — E78.2 MIXED HYPERLIPIDEMIA: ICD-10-CM

## 2021-02-09 PROCEDURE — 99214 OFFICE O/P EST MOD 30 MIN: CPT | Performed by: FAMILY MEDICINE

## 2021-02-09 NOTE — ASSESSMENT & PLAN NOTE
Blood pressure has improved significantly with lifestyle modification and treatment of anxiety.  Patient will continue current treatment plan.  We will not start medications for hypertension at this time.

## 2021-02-09 NOTE — PROGRESS NOTES
"Gerhard Valencia is a 55 y.o. male who presents today for Anxiety and Hypertension      Patient is presenting for follow up of anxiety. Patient feels as though his anxiety has improved significantly. Patient has been compliant taking medication - says he takes it at the same time each day. Patient denies any side effects. His GAD7 score is down from two months ago from 17 to 6.     Patient never started Lipitor, which was prescribed to him at the same time 2 months ago. Wants to get it down on his own through lifestyle modifications of diet and exericse. Patient wants to come back in \"about a month to have cholesterol rechecked.\" Patient not interested in receiving shingle vaccine today .    Hyperlipidemia  This is a new problem. This is a new diagnosis. The problem is uncontrolled. Recent lipid tests were reviewed and are high. He has no history of diabetes or obesity. Factors aggravating his hyperlipidemia include smoking. Pertinent negatives include no chest pain or shortness of breath. Current antihyperlipidemic treatment includes diet change and exercise. Risk factors for coronary artery disease include male sex and dyslipidemia.        AIRAM-7  Over the last two weeks, how often have you been bothered by the following problems?  Feeling nervous, anxious or on edge: 1  Not being able to stop or control worryin(once a week)  Worrying too much about different things: 1(once a week)  Trouble Relaxin  Being so restless that it is hard to sit still: 1  Becoming easily annoyed or irritable: 0  Feeling afraid as if something awful might happen: 1(once every other week)  AIRAM 7 Total Score: 6  If you checked any problems, how difficult have these problems made it for you to do your work, take care of things at home, or get along with other people: Not difficult at all      Review of Systems   Respiratory: Negative for cough and shortness of breath.    Cardiovascular: Negative for chest pain and palpitations.    " "    The following portions of the patient's history were reviewed and updated as appropriate: allergies, current medications, past family history, past medical history, past social history, past surgical history and problem list.    Current Outpatient Medications on File Prior to Visit   Medication Sig Dispense Refill   • [DISCONTINUED] sertraline (ZOLOFT) 50 MG tablet Take 1 tablet by mouth Daily. 30 tablet 3   • Diclofenac Epolamine (FLECTOR) 1.3 % patch patch Apply 1 patch topically to the appropriate area as directed 2 (Two) Times a Day. 30 patch 0   • meloxicam (MOBIC) 15 MG tablet Take 1 tablet by mouth Daily. 60 tablet 2   • nicotine (NICODERM CQ) 14 MG/24HR patch Place 1 patch on the skin as directed by provider Daily. 14 patch 0   • nicotine (NICODERM CQ) 21 MG/24HR patch Place 1 patch on the skin as directed by provider Daily. 21mg daily X 4 weeks, then 14mg DAILY X 2 weeks, then 7mg daily X 2 weeks, then, discontinue. 28 patch 0   • nicotine (NICODERM CQ) 7 MG/24HR patch Place 1 patch on the skin as directed by provider Daily. 14 patch 0   • [DISCONTINUED] atorvastatin (LIPITOR) 40 MG tablet Take 1 tablet by mouth Every Night. 90 tablet 1     No current facility-administered medications on file prior to visit.        No Known Allergies     Visit Vitals  /86   Pulse 92   Temp 97.5 °F (36.4 °C) (Temporal)   Resp 14   Ht 172.7 cm (68\")   Wt 77.1 kg (170 lb)   SpO2 97%   BMI 25.85 kg/m²        Physical Exam  Constitutional:       General: He is not in acute distress.     Appearance: He is well-developed. He is not diaphoretic.   HENT:      Head: Atraumatic.   Neck:      Musculoskeletal: Normal range of motion and neck supple.   Cardiovascular:      Rate and Rhythm: Normal rate and regular rhythm.      Heart sounds: Normal heart sounds. No murmur. No friction rub. No gallop.    Pulmonary:      Effort: Pulmonary effort is normal. No respiratory distress.      Breath sounds: Normal breath sounds. No " stridor. No wheezing, rhonchi or rales.   Chest:      Chest wall: No tenderness.   Skin:     General: Skin is warm and dry.   Neurological:      Mental Status: He is alert and oriented to person, place, and time.   Psychiatric:         Behavior: Behavior normal.          Results for orders placed or performed in visit on 01/05/21   COVID-19 PCR, [facility], NP SWAB IN [facility]SP[facility] MEDIA 24-30 HR TAT - Swab, Nasopharynx    Specimen: Nasopharynx; Swab   Result Value Ref Range    SARS-CoV-2 JERRICA Not Detected Not Detected        Problems Addressed this Visit        Cardiac and Vasculature    Mixed hyperlipidemia     Patient do not  statin medication.  He wishes to try to improve his cholesterol with diet lifestyle modification.  Patient's ASCVD score is greater than 10.  We will recheck lipid panel at follow-up visit if he does not have improvement of cholesterol levels will start statin medication at that time.         Elevated blood pressure reading in office without diagnosis of hypertension     Blood pressure has improved significantly with lifestyle modification and treatment of anxiety.  Patient will continue current treatment plan.  We will not start medications for hypertension at this time.            Mental Health    AIRAM (generalized anxiety disorder) - Primary     Psychological condition is improving with treatment.  Medication changes per orders.  Psychological condition  will be reassessed at the next regular appointment.         Relevant Medications    sertraline (ZOLOFT) 50 MG tablet      Diagnoses       Codes Comments    AIRAM (generalized anxiety disorder)    -  Primary ICD-10-CM: F41.1  ICD-9-CM: 300.02     Mixed hyperlipidemia     ICD-10-CM: E78.2  ICD-9-CM: 272.2     Elevated blood pressure reading in office without diagnosis of hypertension     ICD-10-CM: R03.0  ICD-9-CM: 796.2           Return in about 17 weeks (around 6/8/2021) for Follow-up HLD, anxiety.    Parts of this office note  have been dictated by voice recognition software. Grammatical and/or spelling errors may be present.    Sky Madrid MD   2/9/2021

## 2021-02-09 NOTE — ASSESSMENT & PLAN NOTE
Patient do not  statin medication.  He wishes to try to improve his cholesterol with diet lifestyle modification.  Patient's ASCVD score is greater than 10.  We will recheck lipid panel at follow-up visit if he does not have improvement of cholesterol levels will start statin medication at that time.

## 2021-02-11 ENCOUNTER — OFFICE VISIT (OUTPATIENT)
Dept: ORTHOPEDIC SURGERY | Facility: CLINIC | Age: 56
End: 2021-02-11

## 2021-02-11 DIAGNOSIS — M65.311 TRIGGER FINGER OF RIGHT THUMB: ICD-10-CM

## 2021-02-11 DIAGNOSIS — Z98.890 STATUS POST TRIGGER FINGER RELEASE: Primary | ICD-10-CM

## 2021-02-11 PROCEDURE — 99024 POSTOP FOLLOW-UP VISIT: CPT | Performed by: PHYSICIAN ASSISTANT

## 2021-02-11 NOTE — PROGRESS NOTES
[facility] Note        Subjective     Post-op (2 week follow up; 5 weeks s/p trigger thumb release 1/8/21)       HPI    Gerhard Valencia is a 55 y.o. male.  Returns for follow-up following right thumb A1 pulley trigger digit release performed on the above date by Dr. Garrett.    He reports she is doing well.  He does have some mild scar tissue buildup at the incision site.  No reported numbness or tingling.  No further locking or triggering since time of surgery.    Patient denies any fever, chills, night sweats or other constitutional symptoms.        Objective      Physical Exam  There were no vitals taken for this visit.    There is no height or weight on file to calculate BMI.        Ortho Exam    Right Upper Extremity:       Musculoskeletal                 Inspection and Palpation:                  Thumb:                                     Tenderness -mild incisional                          Swelling -none.                                ROM:   Full range of motion wrist and digits to include thumb without any restrictions or limitations.  Patient is able to make a composite fist.                          Motor: Grossly intact radial, ulnar, median, AIN, PIN.                          Sensory: Grossly intact to light touch radial, ulnar, median nerve distributions.  There is some slight increase sensitivity at the incision site as well.                          Vascular: 2+ radial pulse with brisk capillary refill noted and each digit.                                        Incision:  Well-healed without redness, warmth, drainage or evidence of infection.  Positive scar tissue buildup to the incision.       Imaging Reviewed:  No new imaging today.      Assessment:  1. Status post trigger finger release    2. Trigger finger of right thumb        Plan:  1. Status post right thumb A1 pulley/trigger digit release, stable and doing well.  2. It was taught scar tissue massage.  3. Offered to  refer the patient to formal therapy for scar tissue massage and desensitization.  Patient stated he could do these on his own.  If he changes his mind and wishes for the referral he will contact me.  4. Patient may return to work on 3/15/2021 with no restrictions or limitations.  5. Follow-up our clinic as needed.  6. Questions and concerns answered.      Angélica Dorado PA-C  02/11/21  13:49 EST      Dragon disclaimer:  Much of this encounter note is an electronic transcription/translation of spoken language to printed text. The electronic translation of spoken language may permit erroneous, or at times, nonsensical words or phrases to be inadvertently transcribed; Although I have reviewed the note for such errors, some may still exist.

## 2021-02-12 ENCOUNTER — TELEPHONE (OUTPATIENT)
Dept: FAMILY MEDICINE CLINIC | Facility: CLINIC | Age: 56
End: 2021-02-12

## 2021-02-12 NOTE — TELEPHONE ENCOUNTER
PLEASE CLARIFY    sertraline (ZOLOFT) 50 MG tablet   IS ONE TABLET DAILY # 45.  THEY THOUGHT YOU MIGHT HAVE SAID 1 1/2 TABLET.      PLEASE CALL JIMMY @  [phone]

## 2021-02-12 NOTE — TELEPHONE ENCOUNTER
FAINA WITH [facility] IS REQUESTING CLARIFICATION ON THE PATIENT'S sertraline (ZOLOFT) 50 MG tablet.  SHE REPORTS THE DIRECTIONS SAY ONE DAILY BUT THE QUANTITY IS 45.  SHE FURTHER STATES THAT THE PATIENT SAYS THE DOSAGE SHOULD BE 1 1/2 TABLETS DAILY WHICH WOULD EQUAL THE 45 TABLETS.     PLEASE CALL AND ADVISE -[phone]

## 2021-02-25 DIAGNOSIS — M47.816 SPONDYLOSIS OF LUMBAR REGION WITHOUT MYELOPATHY OR RADICULOPATHY: Primary | ICD-10-CM

## 2021-03-01 ENCOUNTER — APPOINTMENT (OUTPATIENT)
Dept: PREADMISSION TESTING | Facility: HOSPITAL | Age: 56
End: 2021-03-01

## 2021-06-01 DIAGNOSIS — F41.1 GAD (GENERALIZED ANXIETY DISORDER): ICD-10-CM

## 2021-06-01 NOTE — TELEPHONE ENCOUNTER
Caller: Gerhard Valencia    Relationship: Self    Best call back number: [phone]    Medication needed:   Requested Prescriptions     Pending Prescriptions Disp Refills   • sertraline (ZOLOFT) 50 MG tablet 45 tablet 3     Sig: Take 1 tablet by mouth Daily.       What additional details did the patient provide when requesting the medication: PATIENT HAS ONLY A COUPLE PILLS LEFT.     Does the patient have less than a 3 day supply:  [x] Yes  [] No    What is the patient's preferred pharmacy: [id number] [address]  AT [facility] - [phone] [facility] [phone] FX

## 2021-10-09 ENCOUNTER — OFFICE VISIT (OUTPATIENT)
Dept: FAMILY MEDICINE CLINIC | Facility: CLINIC | Age: 56
End: 2021-10-09

## 2021-10-09 VITALS
HEART RATE: 68 BPM | SYSTOLIC BLOOD PRESSURE: 126 MMHG | OXYGEN SATURATION: 98 % | BODY MASS INDEX: 27.13 KG/M2 | RESPIRATION RATE: 16 BRPM | DIASTOLIC BLOOD PRESSURE: 84 MMHG | HEIGHT: 68 IN | WEIGHT: 179 LBS | TEMPERATURE: 98 F

## 2021-10-09 DIAGNOSIS — R60.0 EDEMA OF LEFT LOWER EXTREMITY: ICD-10-CM

## 2021-10-09 DIAGNOSIS — M79.605 ACUTE PAIN OF LEFT LOWER EXTREMITY: Primary | ICD-10-CM

## 2021-10-09 PROCEDURE — 99213 OFFICE O/P EST LOW 20 MIN: CPT | Performed by: NURSE PRACTITIONER

## 2021-10-09 NOTE — PROGRESS NOTES
"Chief Complaint  Foot Swelling (left )    Subjective          Gerhard Valencia presents to [facility]  Leg Swelling  This is a new problem. The current episode started 1 to 4 weeks ago. The problem occurs daily. The problem has been unchanged. Associated symptoms include numbness. Pertinent negatives include no abdominal pain, arthralgias, chest pain, chills, congestion, coughing, fever, nausea, rash or weakness. Associated symptoms comments: Explains tender area to left ankle that refer up left calf and intermittent numbness occurs. The symptoms are aggravated by walking (palpation). He has tried NSAIDs, relaxation and rest for the symptoms. The treatment provided mild relief.   Ankle Pain   Associated symptoms include numbness.       Objective   Vital Signs:   /84   Pulse 68   Temp 98 °F (36.7 °C)   Resp 16   Ht 172.7 cm (68\")   Wt 81.2 kg (179 lb)   SpO2 98%   BMI 27.22 kg/m²     Physical Exam  Vitals and nursing note reviewed.   Constitutional:       General: He is not in acute distress.     Appearance: Normal appearance.   HENT:      Head: Normocephalic.      Right Ear: External ear normal.      Left Ear: External ear normal.      Nose: Nose normal.   Eyes:      Extraocular Movements: Extraocular movements intact.      Conjunctiva/sclera: Conjunctivae normal.      Pupils: Pupils are equal, round, and reactive to light.   Cardiovascular:      Rate and Rhythm: Normal rate and regular rhythm.      Pulses: Normal pulses.           Dorsalis pedis pulses are 2+ on the left side.      Heart sounds: Normal heart sounds.   Pulmonary:      Effort: Pulmonary effort is normal. No respiratory distress.      Breath sounds: No wheezing, rhonchi or rales.   Chest:      Chest wall: No tenderness.   Abdominal:      General: Bowel sounds are normal. There is no distension.      Palpations: Abdomen is soft.   Musculoskeletal:         General: Tenderness present. No signs of injury.     "  Right lower leg: No tenderness. No edema.      Left lower leg: No tenderness. 1+ Edema present.        Feet:       Comments: No calf pain, tenderness or redness bilateral calves. Negative Fernando's sign left    Skin:     General: Skin is warm and dry.      Findings: No bruising, erythema, lesion or rash.   Neurological:      Mental Status: He is alert and oriented to person, place, and time.   Psychiatric:         Mood and Affect: Mood normal.         Behavior: Behavior normal.         Thought Content: Thought content normal.         Judgment: Judgment normal.        Result Review :     Common labs    Common Labsle 12/8/20 12/8/20 12/8/20 12/8/20 12/8/20    1709 1709 1709 1709 1709   Glucose    82    BUN    11    Creatinine    0.79    eGFR Non African Am    102    Sodium    141    Potassium    4.1    Chloride    103    Calcium    9.6    Albumin    4.70    Total Bilirubin    0.2    Alkaline Phosphatase    84    AST (SGOT)    17    ALT (SGPT)    10    WBC 6.82       Hemoglobin 16.1       Hematocrit 48.9       Platelets 292       Total Cholesterol     214 (A)   Triglycerides     129   HDL Cholesterol     49   LDL Cholesterol      142 (A)   Hemoglobin A1C  5.50      PSA   0.906     (A) Abnormal value                     Assessment and Plan    Diagnoses and all orders for this visit:    1. Acute pain of left lower extremity (Primary)  Assessment & Plan:  Elevate lower extremity  Ice to area of pain and discomfort  NSAIDS PRN as directed  Follow up with PCP for annual exam  Venous doppler of left lower extremity ordered to rule out DVT or venous insufficiency  Use soft barrier as discussed like gauze to place between shoe and tender area of foot and wear comfortable and fitting shoes that do not press on affected area or cause discomfort    Orders:  -     Duplex Venous Lower Extremity - Left CAR; Future    2. Edema of left lower extremity  Assessment & Plan:  Elevate lower extremity  Ice to area of pain and  discomfort  NSAIDS PRN as directed  Follow up with PCP for annual exam  Venous doppler of left lower extremity ordered to rule out DVT    Orders:  -     Duplex Venous Lower Extremity - Left CAR; Future    I spent 29 minutes caring for Gerhard on this date of service. This time includes time spent by me in the following activities:preparing for the visit, obtaining and/or reviewing a separately obtained history, performing a medically appropriate examination and/or evaluation , counseling and educating the patient/family/caregiver, ordering medications, tests, or procedures and documenting information in the medical record  Follow Up   Return in about 4 weeks (around 11/6/2021), or if symptoms worsen or fail to improve, for Annual.  Patient was given instructions and counseling regarding his condition or for health maintenance advice. Please see specific information pulled into the AVS if appropriate.

## 2021-10-09 NOTE — ASSESSMENT & PLAN NOTE
Elevate lower extremity  Ice to area of pain and discomfort  NSAIDS PRN as directed  Follow up with PCP for annual exam  Venous doppler of left lower extremity ordered to rule out DVT

## 2021-10-09 NOTE — ASSESSMENT & PLAN NOTE
Elevate lower extremity  Ice to area of pain and discomfort  NSAIDS PRN as directed  Follow up with PCP for annual exam  Venous doppler of left lower extremity ordered to rule out DVT or venous insufficiency  Use soft barrier as discussed like gauze to place between shoe and tender area of foot and wear comfortable and fitting shoes that do not press on affected area or cause discomfort

## 2021-10-28 ENCOUNTER — APPOINTMENT (OUTPATIENT)
Dept: CARDIOLOGY | Facility: HOSPITAL | Age: 56
End: 2021-10-28

## 2021-12-08 DIAGNOSIS — F41.1 GAD (GENERALIZED ANXIETY DISORDER): ICD-10-CM

## 2022-07-22 DIAGNOSIS — F41.1 GAD (GENERALIZED ANXIETY DISORDER): ICD-10-CM

## 2022-07-22 NOTE — TELEPHONE ENCOUNTER
Rx Refill Note  Requested Prescriptions     Pending Prescriptions Disp Refills   • sertraline (ZOLOFT) 50 MG tablet [Pharmacy Med Name: SERTRALINE 50MG TABLETS] 45 tablet 5     Sig: TAKE 1 AND 1/2 TABLETS BY MOUTH DAILY      Last office visit with prescribing clinician: 2/9/2021      Next office visit with prescribing clinician: Visit date not found            Milton Lou MA  07/22/22, 07:30 EDT

## 2024-02-22 ENCOUNTER — LAB (OUTPATIENT)
Dept: LAB | Facility: HOSPITAL | Age: 59
End: 2024-02-22
Payer: COMMERCIAL

## 2024-02-22 ENCOUNTER — OFFICE VISIT (OUTPATIENT)
Dept: FAMILY MEDICINE CLINIC | Facility: CLINIC | Age: 59
End: 2024-02-22
Payer: COMMERCIAL

## 2024-02-22 VITALS
HEART RATE: 79 BPM | OXYGEN SATURATION: 98 % | SYSTOLIC BLOOD PRESSURE: 130 MMHG | BODY MASS INDEX: 25.48 KG/M2 | TEMPERATURE: 98.4 F | WEIGHT: 172 LBS | DIASTOLIC BLOOD PRESSURE: 90 MMHG | HEIGHT: 69 IN

## 2024-02-22 DIAGNOSIS — N50.811 PAIN IN RIGHT TESTICLE: Primary | ICD-10-CM

## 2024-02-22 DIAGNOSIS — N50.811 PAIN IN RIGHT TESTICLE: ICD-10-CM

## 2024-02-22 DIAGNOSIS — N45.1 EPIDIDYMITIS, RIGHT: ICD-10-CM

## 2024-02-22 LAB
BILIRUB UR QL STRIP: NEGATIVE
CLARITY UR: CLEAR
COLOR UR: YELLOW
GLUCOSE UR STRIP-MCNC: NEGATIVE MG/DL
HGB UR QL STRIP.AUTO: NEGATIVE
KETONES UR QL STRIP: NEGATIVE
LEUKOCYTE ESTERASE UR QL STRIP.AUTO: NEGATIVE
NITRITE UR QL STRIP: NEGATIVE
PH UR STRIP.AUTO: 6 [PH] (ref 5–8)
PROT UR QL STRIP: NEGATIVE
SP GR UR STRIP: 1.03 (ref 1–1.03)
UROBILINOGEN UR QL STRIP: NORMAL

## 2024-02-22 PROCEDURE — 87086 URINE CULTURE/COLONY COUNT: CPT

## 2024-02-22 PROCEDURE — 99214 OFFICE O/P EST MOD 30 MIN: CPT | Performed by: FAMILY MEDICINE

## 2024-02-22 PROCEDURE — 81001 URINALYSIS AUTO W/SCOPE: CPT

## 2024-02-22 RX ORDER — SULFAMETHOXAZOLE AND TRIMETHOPRIM 800; 160 MG/1; MG/1
1 TABLET ORAL 2 TIMES DAILY
Qty: 20 TABLET | Refills: 0 | Status: SHIPPED | OUTPATIENT
Start: 2024-02-22 | End: 2024-03-03

## 2024-02-22 NOTE — ASSESSMENT & PLAN NOTE
Patient has had pain in the right testicle for the past 3 to 4 weeks.  Patient does have some tenderness of the right epididymis.  Will go ahead and treat empirically for epididymitis with Bactrim twice daily for 10 days as patient is low risk for sexually transmitted infection being in a monogamous relationship with his wife for the last 10 years.  Will also order urine culture as well as urinalysis, ultrasound of the testicles, and patient was given referral to urology for further evaluation and treatment.  RTC/ED precautions given.

## 2024-02-22 NOTE — PROGRESS NOTES
Gerhard Valencia is a 58 y.o. male who presents today for Groin Pain      Patient has had pain in the right testicle for the last 3-4 weeks. He has noticed it is has been worse since he eats more chocolate. He has a pulling feeling when he is a rest. He states when he gets an erection he gets more pain I the testicle which is dull and aching. He has not seen or felt any swelling, redness, or hot to the touch. He does not have difficulty with ejaculation. He states that he has seen a weakening in his urinary stream, dribbling, incomplete emptying, and trouble stopping and starting. He denies ED, decreased libido, nocturia, burning with urination, foul smelling urine, blood in urine, urine color change, changes in bowel movements, or blood in semen. He has some aching low back pain that has started with the testicle pain. Pain is not worsened by movement. He has not had any new sexual partners. His only partner in the last 10 years has been his wife.          Review of Systems   Constitutional:  Negative for chills, diaphoresis, fatigue, fever and unexpected weight loss.   HENT:  Negative for congestion, ear pain and sore throat.    Eyes:  Negative for visual disturbance.   Respiratory:  Negative for cough, shortness of breath and wheezing.    Cardiovascular:  Negative for chest pain and palpitations.   Gastrointestinal:  Negative for abdominal pain, blood in stool, constipation, diarrhea, nausea, vomiting and GERD.   Endocrine: Negative for polydipsia and polyuria.   Genitourinary:  Positive for testicular pain. Negative for decreased urine volume, difficulty urinating, discharge, dysuria, flank pain, frequency, genital sores, hematuria, nocturia, penile pain, erectile dysfunction, penile swelling, scrotal swelling, urgency and urinary incontinence.   Musculoskeletal:  Positive for back pain. Negative for joint swelling.   Skin:  Negative for rash and skin lesions.   Allergic/Immunologic: Negative for environmental  "allergies.   Neurological:  Negative for dizziness, seizures, syncope, light-headedness and headache.   Hematological:  Does not bruise/bleed easily.   Psychiatric/Behavioral:  Negative for suicidal ideas.         The following portions of the patient's history were reviewed and updated as appropriate: allergies, current medications, past family history, past medical history, past social history, past surgical history and problem list.    Current Outpatient Medications on File Prior to Visit   Medication Sig Dispense Refill    [DISCONTINUED] sertraline (ZOLOFT) 50 MG tablet TAKE 1 AND 1/2 TABLETS BY MOUTH DAILY (Patient not taking: Reported on 2/22/2024) 45 tablet 5     No current facility-administered medications on file prior to visit.       No Known Allergies     Visit Vitals  /90   Pulse 79   Temp 98.4 °F (36.9 °C) (Temporal)   Ht 175.3 cm (69\")   Wt 78 kg (172 lb)   SpO2 98%   BMI 25.40 kg/m²        Physical Exam  Exam conducted with a chaperone present.   Constitutional:       General: He is not in acute distress.     Appearance: He is well-developed. He is not diaphoretic.   HENT:      Head: Atraumatic.   Cardiovascular:      Rate and Rhythm: Normal rate and regular rhythm.      Heart sounds: Normal heart sounds. No murmur heard.     No friction rub. No gallop.   Pulmonary:      Effort: Pulmonary effort is normal. No respiratory distress.      Breath sounds: Normal breath sounds. No stridor. No wheezing, rhonchi or rales.   Abdominal:      General: Bowel sounds are normal. There is no distension.      Palpations: Abdomen is soft. There is no mass.      Tenderness: There is no abdominal tenderness. There is no right CVA tenderness, left CVA tenderness, guarding or rebound.      Hernia: No hernia is present. There is no hernia in the left inguinal area or right inguinal area.   Genitourinary:     Pubic Area: No rash or pubic lice.       Penis: Circumcised.       Testes: Cremasteric reflex is present.    "      Right: Tenderness present. Mass, swelling, testicular hydrocele or varicocele not present. Right testis is descended. Cremasteric reflex is present.          Left: Mass, tenderness, swelling, testicular hydrocele or varicocele not present. Left testis is descended. Cremasteric reflex is present.       Epididymis:      Right: Not inflamed or enlarged. Tenderness present. No mass.      Left: Normal.   Musculoskeletal:      Cervical back: Normal range of motion and neck supple.      Lumbar back: Tenderness (mild paraspinal muscle tenderness) present. No swelling, edema, deformity, signs of trauma, lacerations, spasms or bony tenderness. Normal range of motion. Negative right straight leg raise test and negative left straight leg raise test. No scoliosis.   Lymphadenopathy:      Lower Body: No right inguinal adenopathy. No left inguinal adenopathy.   Skin:     General: Skin is warm and dry.   Neurological:      Mental Status: He is alert and oriented to person, place, and time.   Psychiatric:         Behavior: Behavior normal.          Results for orders placed or performed in visit on 01/05/21   COVID-19 PCR, [facility], NP SWAB IN [employer] VIRAL TRANSPORT MEDIA 24-30 HR TAT - Swab, Nasopharynx    Specimen: Nasopharynx; Swab   Result Value Ref Range    SARS-CoV-2 JERRICA Not Detected Not Detected        Problems Addressed this Visit          Genitourinary and Reproductive     Pain in right testicle - Primary     Patient has had pain in the right testicle for the past 3 to 4 weeks.  Patient does have some tenderness of the right epididymis.  Will go ahead and treat empirically for epididymitis with Bactrim twice daily for 10 days as patient is low risk for sexually transmitted infection being in a monogamous relationship with his wife for the last 10 years.  Will also order urine culture as well as urinalysis, ultrasound of the testicles, and patient was given referral to urology for further evaluation and treatment.   RTC/ED precautions given.         Relevant Medications    sulfamethoxazole-trimethoprim (BACTRIM DS,SEPTRA DS) 800-160 MG per tablet    Other Relevant Orders    Ambulatory Referral to Urology    Urine Culture - Urine, Urine, Clean Catch    Urinalysis With Microscopic - Urine, Clean Catch    US Scrotum & Testicles    Epididymitis, right    Relevant Medications    sulfamethoxazole-trimethoprim (BACTRIM DS,SEPTRA DS) 800-160 MG per tablet     Diagnoses         Codes Comments    Pain in right testicle    -  Primary ICD-10-CM: N50.811  ICD-9-CM: 608.9     Epididymitis, right     ICD-10-CM: N45.1  ICD-9-CM: 604.90             Return in about 1 month (around 3/22/2024) for Annual.    Sky Madrid MD   2/22/2024

## 2024-02-23 LAB
BACTERIA UR QL AUTO: ABNORMAL /HPF
COD CRY URNS QL: ABNORMAL /HPF
HYALINE CASTS UR QL AUTO: ABNORMAL /LPF
RBC # UR STRIP: ABNORMAL /HPF
REF LAB TEST METHOD: ABNORMAL
SQUAMOUS #/AREA URNS HPF: ABNORMAL /HPF
WBC # UR STRIP: ABNORMAL /HPF

## 2024-02-24 LAB — BACTERIA SPEC AEROBE CULT: NO GROWTH

## 2024-02-28 ENCOUNTER — TELEPHONE (OUTPATIENT)
Dept: FAMILY MEDICINE CLINIC | Facility: CLINIC | Age: 59
End: 2024-02-28
Payer: COMMERCIAL

## 2024-02-28 NOTE — TELEPHONE ENCOUNTER
HUB TO RELAY      LVM       Per Dr. Madrid-     Please let the patient know that his urine culture did not grow bacteria, urinalysis without microscopic analysis was normal, and the microscopic analysis of his urine showed a few white blood cells.  Patient should continue current treatment plan.  If patient has any questions they can contact me.

## 2024-02-28 NOTE — TELEPHONE ENCOUNTER
Name: STEPHEN GAITAN      Relationship:       Best Callback Number:[phone]      HUB PROVIDED THE RELAY MESSAGE FROM THE OFFICE      PATIENT: VOICED UNDERSTANDING AND HAS NO FURTHER QUESTIONS AT THIS TIME    ADDITIONAL INFORMATION:

## 2024-03-25 ENCOUNTER — OFFICE VISIT (OUTPATIENT)
Dept: FAMILY MEDICINE CLINIC | Facility: CLINIC | Age: 59
End: 2024-03-25
Payer: COMMERCIAL

## 2024-03-25 VITALS
OXYGEN SATURATION: 96 % | HEIGHT: 69 IN | TEMPERATURE: 97.6 F | HEART RATE: 98 BPM | BODY MASS INDEX: 26.04 KG/M2 | WEIGHT: 175.8 LBS | SYSTOLIC BLOOD PRESSURE: 138 MMHG | DIASTOLIC BLOOD PRESSURE: 92 MMHG

## 2024-03-25 DIAGNOSIS — Z23 IMMUNIZATION DUE: ICD-10-CM

## 2024-03-25 DIAGNOSIS — E78.2 MIXED HYPERLIPIDEMIA: ICD-10-CM

## 2024-03-25 DIAGNOSIS — Z12.5 SCREENING FOR PROSTATE CANCER: ICD-10-CM

## 2024-03-25 DIAGNOSIS — Z12.11 COLON CANCER SCREENING: ICD-10-CM

## 2024-03-25 DIAGNOSIS — F17.210 SMOKING GREATER THAN 20 PACK YEARS: ICD-10-CM

## 2024-03-25 DIAGNOSIS — Z00.00 WELL ADULT EXAM: Primary | ICD-10-CM

## 2024-03-25 NOTE — LETTER
[facility]  Vaccine Consent Form    Patient Name:  Gerhard Valencia  Patient :  1965     Vaccine(s) Ordered    Tdap Vaccine Greater Than or Equal To 6yo IM        Screening Checklist  The following questions should be completed prior to vaccination. If you answer “yes” to any question, it does not necessarily mean you should not be vaccinated. It just means we may need to clarify or ask more questions. If a question is unclear, please ask your healthcare provider to explain it.    Yes No   Any fever or moderate to severe illness today (mild illness and/or antibiotic treatment are not contraindications)?     Do you have a history of a serious reaction to any previous vaccinations, such as anaphylaxis, encephalopathy within 7 days, Guillain-Vader syndrome within 6 weeks, seizure?     Have you received any live vaccine(s) (e.g MMR, BRANDI) or any other vaccines in the last month (to ensure duplicate doses aren't given)?     Do you have an anaphylactic allergy to latex (DTaP, DTaP-IPV, Hep A, Hep B, MenB, RV, Td, Tdap), baker’s yeast (Hep B, HPV), polysorbates (RSV, nirsevimab, PCV 20, Rotavirrus, Tdap, Shingrix), or gelatin (BRANDI, MMR)?     Do you have an anaphylactic allergy to neomycin (Rabies, BRANDI, MMR, IPV, Hep A), polymyxin B (IPV), or streptomycin (IPV)?      Any cancer, leukemia, AIDS, or other immune system disorder? (BRANDI, MMR, RV)     Do you have a parent, brother, or sister with an immune system problem (if immune competence of vaccine recipient clinically verified, can proceed)? (MMR, BRANDI)     Any recent steroid treatments for >2 weeks, chemotherapy, or radiation treatment? (BRANDI, MMR)     Have you received antibody-containing blood transfusions or IVIG in the past 11 months (recommended interval is dependent on product)? (MMR, BRANDI)     Have you taken antiviral drugs (acyclovir, famciclovir, valacyclovir for BRANDI) in the last 24 or 48 hours, respectively?      Are you pregnant or planning to become  "pregnant within 1 month? (BRANDI, MMR, HPV, IPV, MenB, Abrexvy; For Hep B- refer to Engerix-B; For RSV - Abrysvo is indicated for 32-36 weeks of pregnancy from September to January)     For infants, have you ever been told your child has had intussusception or a medical emergency involving obstruction of the intestine (Rotavirus)? If not for an infant, can skip this question.         *Ordering Physicians/APC should be consulted if \"yes\" is checked by the patient or guardian above.  I have received, read, and understand the Vaccine Information Statement (VIS) for each vaccine ordered.  I have considered my or my child's health status as well as the health status of my close contacts.  I have taken the opportunity to discuss my vaccine questions with my or my child's health care provider.   I have requested that the ordered vaccine(s) be given to me or my child.  I understand the benefits and risks of the vaccines.  I understand that I should remain in the clinic for 15 minutes after receiving the vaccine(s).  _________________________________________________________  Signature of Patient or Parent/Legal Guardian ____________________  Date     "

## 2024-03-25 NOTE — PROGRESS NOTES
Gerhard Valencia is a 58 y.o. male who presents today to complete annual exam.    Chief Complaint   Patient presents with    Annual Exam    Testicle Pain        Patient is here for annual exam. He has some mild pain in the testicles which waxes and wanes. He did not have the US done but has it rescheduled and has an appointment with urology pending as well. He saw some improvement with antibiotic but did not see complete resolution. Testicle pain has not worsened and is about a 1/10. He does not exercise but is very active walking and lifting at work. He eats a generally healthy and varied diet but he does eat too much candy at night. He normally sees the dentist twice a year but is looking for a new dentist bc his closed. He sees the optometrist once a year. He has not seen the dermatologist but plans to schedule an appointment. He has no acute complaints today.        Review of Systems   Constitutional:  Negative for fever and unexpected weight loss.   HENT:  Negative for congestion, ear pain and sore throat.    Eyes:  Negative for visual disturbance.   Respiratory:  Negative for cough, shortness of breath and wheezing.    Cardiovascular:  Negative for chest pain and palpitations.   Gastrointestinal:  Negative for abdominal pain, blood in stool, constipation, diarrhea, nausea, vomiting and GERD.   Endocrine: Negative for polydipsia and polyuria.   Genitourinary:  Positive for testicular pain (improving but still present). Negative for difficulty urinating.   Musculoskeletal:  Negative for joint swelling.   Skin:  Negative for rash and skin lesions.   Allergic/Immunologic: Negative for environmental allergies.   Neurological:  Negative for seizures and syncope.   Hematological:  Does not bruise/bleed easily.   Psychiatric/Behavioral:  Negative for suicidal ideas.             2/22/2024     1:28 PM   PHQ-2/PHQ-9 Depression Screening   Little Interest or Pleasure in Doing Things 0-->not at all   Feeling Down, Depressed  "or Hopeless 0-->not at all   PHQ-9: Brief Depression Severity Measure Score 0           Past Medical History:   Diagnosis Date    Bell's palsy     Diverticulosis     AIRAM (generalized anxiety disorder)     GERD (gastroesophageal reflux disease)     Hiatal hernia     Tobacco dependence         Past Surgical History:   Procedure Laterality Date    COLONOSCOPY  2016    HAND SURGERY Right 01/08/2021    right trigger thumb release    HIATAL HERNIA REPAIR  2004    INGUINAL HERNIA REPAIR Bilateral 1966    NEUROPLASTY / TRANSPOSITION ULNAR NERVE AT ELBOW Left 2012    NISSEN FUNDOPLICATION  1999    UPPER GASTROINTESTINAL ENDOSCOPY  2016        Family History   Problem Relation Age of Onset    Diabetes Mother     Lung cancer Father     Diabetes Brother     Diabetes Maternal Grandmother     Heart disease Maternal Grandmother     No Known Problems Maternal Grandfather     No Known Problems Paternal Grandmother     No Known Problems Paternal Grandfather     Heart disease Maternal Aunt     No Known Problems Brother         Social History     Socioeconomic History    Marital status:      Spouse name: Cesar    Number of children: 0    Years of education: H.S.   Tobacco Use    Smoking status: Every Day     Current packs/day: 1.00     Average packs/day: 1 pack/day for 22.0 years (22.0 ttl pk-yrs)     Types: Cigarettes    Smokeless tobacco: Never   Vaping Use    Vaping status: Never Used   Substance and Sexual Activity    Alcohol use: Yes     Comment: rarely    Drug use: No    Sexual activity: Not Currently     Partners: Female     Birth control/protection: Post-menopausal     Comment: wife only        No current outpatient medications on file prior to visit.     No current facility-administered medications on file prior to visit.       No Known Allergies     Visit Vitals  /92   Pulse 98   Temp 97.6 °F (36.4 °C) (Infrared)   Ht 175.3 cm (69.02\")   Wt 79.7 kg (175 lb 12.8 oz)   SpO2 96%   BMI 25.95 kg/m²      Body mass " index is 25.95 kg/m².    Physical Exam  Constitutional:       General: He is not in acute distress.     Appearance: He is well-developed. He is not diaphoretic.   HENT:      Head: Atraumatic.   Cardiovascular:      Rate and Rhythm: Normal rate and regular rhythm.      Heart sounds: Normal heart sounds. No murmur heard.     No friction rub. No gallop.   Pulmonary:      Effort: Pulmonary effort is normal. No respiratory distress.      Breath sounds: Normal breath sounds. No stridor. No wheezing, rhonchi or rales.   Abdominal:      General: Bowel sounds are normal. There is no distension.      Palpations: Abdomen is soft. There is no mass.      Tenderness: There is no abdominal tenderness. There is no guarding or rebound.      Hernia: No hernia is present.   Musculoskeletal:      Cervical back: Normal range of motion and neck supple.   Skin:     General: Skin is warm and dry.   Neurological:      Mental Status: He is alert and oriented to person, place, and time.   Psychiatric:         Behavior: Behavior normal.          Results for orders placed or performed in visit on 02/22/24   Urine Culture - Urine, Urine, Clean Catch    Specimen: Urine, Clean Catch   Result Value Ref Range    Urine Culture No growth    Urinalysis without microscopic (no culture) - Urine, Clean Catch    Specimen: Urine, Clean Catch   Result Value Ref Range    Color, UA Yellow Yellow, Straw    Appearance, UA Clear Clear    pH, UA 6.0 5.0 - 8.0    Specific Gravity, UA 1.027 1.005 - 1.030    Glucose, UA Negative Negative    Ketones, UA Negative Negative    Bilirubin, UA Negative Negative    Blood, UA Negative Negative    Protein, UA Negative Negative    Leuk Esterase, UA Negative Negative    Nitrite, UA Negative Negative    Urobilinogen, UA 0.2 E.U./dL 0.2 - 1.0 E.U./dL   Urinalysis, Microscopic Only - Urine, Clean Catch    Specimen: Urine, Clean Catch   Result Value Ref Range    RBC, UA 0-2 None Seen, 0-2 /HPF    WBC, UA 3-5 (A) None Seen, 0-2 /HPF     Bacteria, UA None Seen None Seen /HPF    Squamous Epithelial Cells, UA 0-2 None Seen, 0-2 /HPF    Hyaline Casts, UA 0-2 None Seen /LPF    Calcium Oxalate Crystals, UA Small/1+ None Seen /HPF    Methodology Manual Light Microscopy         Problems Addressed this Visit          Cardiac and Vasculature    Mixed hyperlipidemia    Relevant Orders    Comprehensive Metabolic Panel    Lipid Panel       Gastrointestinal Abdominal     Colon cancer screening    Relevant Orders    Ambulatory Referral For Screening Colonoscopy       Genitourinary and Reproductive     Screening for prostate cancer    Relevant Orders    PSA Screen       Health Encounters    Well adult exam - Primary     The patient is here for health maintenance visit.  Currently, the patient consumes a healthy diet and has an adequate exercise regimen.  Screening lab work is ordered.  Immunizations were reviewed today.  Advice and education was given regarding nutrition, aerobic exercise, routine dental evaluations, routine eye exams, reproductive health, cardiovascular risk reduction, sunscreen use, self skin examination (annual dermatology evaluations) and seatbelt use (general overall safety).  Further recommendations will be given if needed after lab evaluation.  Annual wellness evaluation is recommended.           Relevant Orders    Comprehensive Metabolic Panel    TSH Rfx On Abnormal To Free T4    CBC & Differential    Lipid Panel    PSA Screen    Hemoglobin A1c       Tobacco    Smoking greater than 20 pack years    Relevant Orders     CT Chest Low Dose Cancer Screening WO     Other Visit Diagnoses       Immunization due        Relevant Orders    Tdap Vaccine Greater Than or Equal To 8yo IM (Completed)          Diagnoses         Codes Comments    Well adult exam    -  Primary ICD-10-CM: Z00.00  ICD-9-CM: V70.0     Mixed hyperlipidemia     ICD-10-CM: E78.2  ICD-9-CM: 272.2     Screening for prostate cancer     ICD-10-CM: Z12.5  ICD-9-CM: V76.44     Colon  cancer screening     ICD-10-CM: Z12.11  ICD-9-CM: V76.51     Immunization due     ICD-10-CM: Z23  ICD-9-CM: V05.9     Smoking greater than 20 pack years     ICD-10-CM: F17.210  ICD-9-CM: 305.1             Return in about 1 year (around 3/25/2025) for Annual.    Sky Madrid MD  3/25/2024

## 2024-03-25 NOTE — PATIENT INSTRUCTIONS
"Smoking Tobacco Information, Adult  Smoking tobacco can be harmful to your health. Tobacco contains a toxic colorless chemical called nicotine. Nicotine causes changes in your brain that make you want more and more. This is called addiction. This can make it hard to stop smoking once you start. Tobacco also has other toxic chemicals that can hurt your body and raise your risk of many cancers.  Menthol or \"lite\" tobacco or cigarette brands are not safer than regular brands.  How can smoking tobacco affect me?  Smoking tobacco puts you at risk for:  Cancer. Smoking is most commonly associated with lung cancer, but can also lead to cancer in other parts of the body.  Chronic obstructive pulmonary disease (COPD). This is a long-term lung condition that makes it hard to breathe. It also gets worse over time.  High blood pressure (hypertension), heart disease, stroke, heart attack, and lung infections, such as pneumonia.  Cataracts. This is when the lenses in the eyes become clouded.  Digestive problems. This may include peptic ulcers, heartburn, and gastroesophageal reflux disease (GERD).  Oral health problems, such as gum disease, mouth sores, and tooth loss.  Loss of taste and smell.  Smoking also affects how you look and smell. Smoking may cause:  Wrinkles.  Yellow or stained teeth, fingers, and fingernails.  Bad breath.  Bad-smelling clothes and hair.  Smoking tobacco can also affect your social life, because:  It may be challenging to find places to smoke when away from home. Many workplaces, restaurants, hotels, and public places are tobacco-free.  Smoking is expensive. This is due to the cost of tobacco and the long-term costs of treating health problems from smoking.  Secondhand smoke may affect those around you. Secondhand smoke can cause lung cancer, breathing problems, and heart disease. Children of smokers have a higher risk for:  Sudden infant death syndrome (SIDS).  Ear infections.  Lung infections.  What " actions can I take to prevent health problems?  Quit smoking    Do not start smoking. Quit if you already smoke.  Do not replace cigarette smoking with vaping devices, such as e-cigarettes.  Make a plan to quit smoking and commit to it. Look for programs to help you, and ask your health care provider for recommendations and ideas. Set a date and write down all the reasons you want to quit.  Let your friends and family know you are quitting so they can help and support you. Consider finding friends who also want to quit. It can be easier to quit with someone else, so that you can support each other.  Talk with your health care provider about using nicotine replacement medicines to help you quit. These include gum, lozenges, patches, sprays, or pills.  If you try to quit but return to smoking, stay positive. It is common to slip up when you first quit, so take it one day at a time.  Be prepared for cravings. When you feel the urge to smoke, chew gum or suck on hard candy.  Lifestyle  Stay busy.  Take care of your body. Get plenty of exercise, eat a healthy diet, and drink plenty of water.  Find ways to manage your stress, such as meditation, yoga, exercise, or time spent with friends and family.  Ask your health care provider about having regular tests (screenings) to check for cancer. This may include blood tests, imaging tests, and other tests.  Where to find support  To get support to quit smoking, consider:  Asking your health care provider for more information and resources.  Joining a support group for people who want to quit smoking in your local community. There are many effective programs that may help you to quit.  Calling the smokefree.gov counselor helpline at 6-601-QUIT-NOW (1-571.221.7097).  Where to find more information  You may find more information about quitting smoking from:  Centers for Disease Control and Prevention: cdc.gov/tobacco  Smokefree.gov: smokefree.gov  American Lung Association:  "freedomfromsmoking.org  Contact a health care provider if:  You have problems breathing.  Your lips, nose, or fingers turn blue.  You have chest pain.  You are coughing up blood.  You feel like you will faint.  You have other health changes that cause you to worry.  Summary  Smoking tobacco can negatively affect your health, the health of those around you, your finances, and your social life.  Do not start smoking. Quit if you already smoke. If you need help quitting, ask your health care provider.  Consider joining a support group for people in your local community who want to quit smoking. There are many effective programs that may help you to quit.  This information is not intended to replace advice given to you by your health care provider. Make sure you discuss any questions you have with your health care provider.  Document Revised: 12/13/2022 Document Reviewed: 12/13/2022  [employer] Patient Education © 2023 [employer]  Hypertension, Adult  High blood pressure (hypertension) is when the force of blood pumping through the arteries is too strong. The arteries are the blood vessels that carry blood from the heart throughout the body. Hypertension forces the heart to work harder to pump blood and may cause arteries to become narrow or stiff. Untreated or uncontrolled hypertension can lead to a heart attack, heart failure, a stroke, kidney disease, and other problems.  A blood pressure reading consists of a higher number over a lower number. Ideally, your blood pressure should be below 120/80. The first (\"top\") number is called the systolic pressure. It is a measure of the pressure in your arteries as your heart beats. The second (\"bottom\") number is called the diastolic pressure. It is a measure of the pressure in your arteries as the heart relaxes.  What are the causes?  The exact cause of this condition is not known. There are some conditions that result in high blood pressure.  What increases the " risk?  Certain factors may make you more likely to develop high blood pressure. Some of these risk factors are under your control, including:  Smoking.  Not getting enough exercise or physical activity.  Being overweight.  Having too much fat, sugar, calories, or salt (sodium) in your diet.  Drinking too much alcohol.  Other risk factors include:  Having a personal history of heart disease, diabetes, high cholesterol, or kidney disease.  Stress.  Having a family history of high blood pressure and high cholesterol.  Having obstructive sleep apnea.  Age. The risk increases with age.  What are the signs or symptoms?  High blood pressure may not cause symptoms. Very high blood pressure (hypertensive crisis) may cause:  Headache.  Fast or irregular heartbeats (palpitations).  Shortness of breath.  Nosebleed.  Nausea and vomiting.  Vision changes.  Severe chest pain, dizziness, and seizures.  How is this diagnosed?  This condition is diagnosed by measuring your blood pressure while you are seated, with your arm resting on a flat surface, your legs uncrossed, and your feet flat on the floor. The cuff of the blood pressure monitor will be placed directly against the skin of your upper arm at the level of your heart. Blood pressure should be measured at least twice using the same arm. Certain conditions can cause a difference in blood pressure between your right and left arms.  If you have a high blood pressure reading during one visit or you have normal blood pressure with other risk factors, you may be asked to:  Return on a different day to have your blood pressure checked again.  Monitor your blood pressure at home for 1 week or longer.  If you are diagnosed with hypertension, you may have other blood or imaging tests to help your health care provider understand your overall risk for other conditions.  How is this treated?  This condition is treated by making healthy lifestyle changes, such as eating healthy foods,  exercising more, and reducing your alcohol intake. You may be referred for counseling on a healthy diet and physical activity.  Your health care provider may prescribe medicine if lifestyle changes are not enough to get your blood pressure under control and if:  Your systolic blood pressure is above 130.  Your diastolic blood pressure is above 80.  Your personal target blood pressure may vary depending on your medical conditions, your age, and other factors.  Follow these instructions at home:  Eating and drinking    Eat a diet that is high in fiber and potassium, and low in sodium, added sugar, and fat. An example of this eating plan is called the DASH diet. DASH stands for Dietary Approaches to Stop Hypertension. To eat this way:  Eat plenty of fresh fruits and vegetables. Try to fill one half of your plate at each meal with fruits and vegetables.  Eat whole grains, such as whole-wheat pasta, brown rice, or whole-grain bread. Fill about one fourth of your plate with whole grains.  Eat or drink low-fat dairy products, such as skim milk or low-fat yogurt.  Avoid fatty cuts of meat, processed or cured meats, and poultry with skin. Fill about one fourth of your plate with lean proteins, such as fish, chicken without skin, beans, eggs, or tofu.  Avoid pre-made and processed foods. These tend to be higher in sodium, added sugar, and fat.  Reduce your daily sodium intake. Many people with hypertension should eat less than 1,500 mg of sodium a day.  Do not drink alcohol if:  Your health care provider tells you not to drink.  You are pregnant, may be pregnant, or are planning to become pregnant.  If you drink alcohol:  Limit how much you have to:  0-1 drink a day for women.  0-2 drinks a day for men.  Know how much alcohol is in your drink. In the [place] one drink equals one 12 oz bottle of beer (355 mL), one 5 oz glass of wine (148 mL), or one 1½ oz glass of hard liquor (44 mL).  Lifestyle    Work with your health care  provider to maintain a healthy body weight or to lose weight. Ask what an ideal weight is for you.  Get at least 30 minutes of exercise that causes your heart to beat faster (aerobic exercise) most days of the week. Activities may include walking, swimming, or biking.  Include exercise to strengthen your muscles (resistance exercise), such as Pilates or lifting weights, as part of your weekly exercise routine. Try to do these types of exercises for 30 minutes at least 3 days a week.  Do not use any products that contain nicotine or tobacco. These products include cigarettes, chewing tobacco, and vaping devices, such as e-cigarettes. If you need help quitting, ask your health care provider.  Monitor your blood pressure at home as told by your health care provider.  Keep all follow-up visits. This is important.  Medicines  Take over-the-counter and prescription medicines only as told by your health care provider. Follow directions carefully. Blood pressure medicines must be taken as prescribed.  Do not skip doses of blood pressure medicine. Doing this puts you at risk for problems and can make the medicine less effective.  Ask your health care provider about side effects or reactions to medicines that you should watch for.  Contact a health care provider if you:  Think you are having a reaction to a medicine you are taking.  Have headaches that keep coming back (recurring).  Feel dizzy.  Have swelling in your ankles.  Have trouble with your vision.  Get help right away if you:  Develop a severe headache or confusion.  Have unusual weakness or numbness.  Feel faint.  Have severe pain in your chest or abdomen.  Vomit repeatedly.  Have trouble breathing.  These symptoms may be an emergency. Get help right away. Call 911.  Do not wait to see if the symptoms will go away.  Do not drive yourself to the hospital.  Summary  Hypertension is when the force of blood pumping through your arteries is too strong. If this condition  "is not controlled, it may put you at risk for serious complications.  Your personal target blood pressure may vary depending on your medical conditions, your age, and other factors. For most people, a normal blood pressure is less than 120/80.  Hypertension is treated with lifestyle changes, medicines, or a combination of both. Lifestyle changes include losing weight, eating a healthy, low-sodium diet, exercising more, and limiting alcohol.  This information is not intended to replace advice given to you by your health care provider. Make sure you discuss any questions you have with your health care provider.  Document Revised: 10/25/2022 Document Reviewed: 10/25/2022  [employer] Patient Education © 2023 [employer]  BMI for Adults  Body mass index (BMI) is a number found using a person's weight and height. BMI can help tell how much of a person's weight is made up of fat. BMI does not measure body fat directly. It is used instead of tests that directly measure body fat, which can be difficult and expensive.  What are BMI measurements used for?  BMI is useful to:  Find out if your weight puts you at higher risk for medical problems.  Help recommend changes, such as in diet and exercise. This can help you reach a healthy weight. BMI screening can be done again to see if these changes are working.  How is BMI calculated?  Your height and weight are measured. The BMI is found from those numbers. This can be done with [place] or metric measurements. Note that charts and online BMI calculators are available to help you find your BMI quickly and easily without doing these calculations.  To calculate your BMI in [place] measurements:  Measure your weight in pounds (lb).  Multiply the number of pounds by 703.  So, for an adult who weighs 150 lb, multiply that number by 703: 150 x 703, which equals 105,450.  Measure your height in inches. Then multiply that number by itself to get a measurement called \"inches squared.\"  So, for an " "adult who is 70 inches tall, the \"inches squared\" measurement is 70 inches x 70 inches, which equals 4,900 inches squared.  Divide the total from step 2 (number of lb x 703) by the total from step 3 (inches squared): 105,450 ÷ 4,900 = 21.5. This is your BMI.  To calculate your BMI in metric measurements:    Measure your weight in kilograms (kg).  For this example, the weight is 70 kg.  Measure your height in meters (m). Then multiply that number by itself to get a measurement called \"meters squared.\"  So, for an adult who is 1.75 m tall, the \"meters squared\" measurement is 1.75 m x 1.75 m, which equals 3.1 meters squared.  Divide the number of kilograms (your weight) by the meters squared number. In this example: 70 ÷ 3.1 = 22.6. This is your BMI.  What do the results mean?  BMI charts are used to see if you are underweight, normal weight, overweight, or obese. The following guidelines will be used:  Underweight: BMI less than 18.5.  Normal weight: BMI between 18.5 and 24.9.  Overweight: BMI between 25 and 29.9.  Obese: BMI of 30 or above.  BMI is a tool and cannot diagnose a condition. Talk with your health care provider about what your BMI means for you. Keep these notes in mind:  Weight includes fat and muscle. Someone with a muscular build, such as an athlete, may have a BMI that is higher than 24.9. In cases like these, BMI is not a correct measure of body fat.  If you have a BMI of 25 or higher, your provider may need to do more testing to find out if excess body fat is the cause.  BMI is measured the same way for males and females. Females usually have more body fat than males of the same height and weight.  Where to find more information  For more information about BMI, including tools to quickly find your BMI, go to:  Centers for Disease Control and Prevention: cdc.gov  American Heart Association: heart.org  National Heart, Lung, and Blood [place]: nhlbi.nih.gov  This information is not intended to " replace advice given to you by your health care provider. Make sure you discuss any questions you have with your health care provider.  Document Revised: 09/07/2023 Document Reviewed: 08/31/2023  [employer] Patient Education © 2023 [employer]

## 2024-03-25 NOTE — LETTER
[facility]  Vaccine Consent Form    Patient Name:  Gerhard Valencia  Patient :  1965     Vaccine(s) Ordered    Tdap Vaccine Greater Than or Equal To 6yo IM        Screening Checklist  The following questions should be completed prior to vaccination. If you answer “yes” to any question, it does not necessarily mean you should not be vaccinated. It just means we may need to clarify or ask more questions. If a question is unclear, please ask your healthcare provider to explain it.    Yes No   Any fever or moderate to severe illness today (mild illness and/or antibiotic treatment are not contraindications)?     Do you have a history of a serious reaction to any previous vaccinations, such as anaphylaxis, encephalopathy within 7 days, Guillain-[place] syndrome within 6 weeks, seizure?     Have you received any live vaccine(s) (e.g MMR, BRANDI) or any other vaccines in the last month (to ensure duplicate doses aren't given)?     Do you have an anaphylactic allergy to latex (DTaP, DTaP-IPV, Hep A, Hep B, MenB, RV, Td, Tdap), baker’s yeast (Hep B, HPV), polysorbates (RSV, nirsevimab, PCV 20, Rotavirrus, Tdap, Shingrix), or gelatin (BRANDI, MMR)?     Do you have an anaphylactic allergy to neomycin (Rabies, BRANDI, MMR, IPV, Hep A), polymyxin B (IPV), or streptomycin (IPV)?      Any cancer, leukemia, AIDS, or other immune system disorder? (BRANDI, MMR, RV)     Do you have a parent, brother, or sister with an immune system problem (if immune competence of vaccine recipient clinically verified, can proceed)? (MMR, BRANDI)     Any recent steroid treatments for >2 weeks, chemotherapy, or radiation treatment? (BARNDI, MMR)     Have you received antibody-containing blood transfusions or IVIG in the past 11 months (recommended interval is dependent on product)? (MMR, BRANDI)     Have you taken antiviral drugs (acyclovir, famciclovir, valacyclovir for BRANDI) in the last 24 or 48 hours, respectively?      Are you pregnant or planning to become  "pregnant within 1 month? (BRANDI, MMR, HPV, IPV, MenB, Abrexvy; For Hep B- refer to Engerix-B; For RSV - Abrysvo is indicated for 32-36 weeks of pregnancy from September to January)     For infants, have you ever been told your child has had intussusception or a medical emergency involving obstruction of the intestine (Rotavirus)? If not for an infant, can skip this question.         *Ordering Physicians/APC should be consulted if \"yes\" is checked by the patient or guardian above.  I have received, read, and understand the Vaccine Information Statement (VIS) for each vaccine ordered.  I have considered my or my child's health status as well as the health status of my close contacts.  I have taken the opportunity to discuss my vaccine questions with my or my child's health care provider.   I have requested that the ordered vaccine(s) be given to me or my child.  I understand the benefits and risks of the vaccines.  I understand that I should remain in the clinic for 15 minutes after receiving the vaccine(s).  _________________________________________________________  Signature of Patient or Parent/Legal Guardian ____________________  Date     "

## 2024-04-24 ENCOUNTER — HOSPITAL ENCOUNTER (OUTPATIENT)
Dept: ULTRASOUND IMAGING | Facility: HOSPITAL | Age: 59
Discharge: HOME | End: 2024-04-24
Admitting: FAMILY MEDICINE
Payer: COMMERCIAL

## 2024-04-24 ENCOUNTER — OFFICE VISIT (OUTPATIENT)
Dept: UROLOGY | Facility: CLINIC | Age: 59
End: 2024-04-24
Payer: COMMERCIAL

## 2024-04-24 VITALS
WEIGHT: 175 LBS | OXYGEN SATURATION: 97 % | HEIGHT: 69 IN | SYSTOLIC BLOOD PRESSURE: 142 MMHG | HEART RATE: 78 BPM | DIASTOLIC BLOOD PRESSURE: 88 MMHG | BODY MASS INDEX: 25.92 KG/M2

## 2024-04-24 DIAGNOSIS — N50.811 PAIN IN RIGHT TESTICLE: ICD-10-CM

## 2024-04-24 DIAGNOSIS — N40.1 BENIGN LOCALIZED PROSTATIC HYPERPLASIA WITH LOWER URINARY TRACT SYMPTOMS (LUTS): Primary | ICD-10-CM

## 2024-04-24 PROCEDURE — 76870 US EXAM SCROTUM: CPT

## 2024-04-24 RX ORDER — TAMSULOSIN HYDROCHLORIDE 0.4 MG/1
1 CAPSULE ORAL DAILY
Qty: 90 CAPSULE | Refills: 3 | Status: SHIPPED | OUTPATIENT
Start: 2024-04-24 | End: 2025-04-19

## 2024-04-24 NOTE — PROGRESS NOTES
Office Visit New Urology      Patient Name: Gerhard Valencia  : 1965   MRN: [id number]     Chief Complaint:    Chief Complaint   Patient presents with    Testicle Pain       Referring Provider: Sky Madrid MD    History of Present Illness: Gerhard Valencia is a 58 y.o. male who presents to Urology today for right scrotal pain.  He reports he has a discomfort.  He saw his PCP who treated for epididymitis.  He states that he still has some discomfort but it has improved.  He states when he pushes on it it hurts him.      He reports occasional weak stream.  No has some hesitancy.  No dysuria or gross hematuria.      Subjective      Review of System:   Review of Systems   Constitutional: Negative.    HENT: Negative.     Eyes: Negative.    Respiratory: Negative.     Cardiovascular: Negative.    Gastrointestinal: Negative.    Endocrine: Negative.    Genitourinary:  Positive for difficulty urinating and testicular pain.   Musculoskeletal: Negative.    Allergic/Immunologic: Negative.    Neurological: Negative.    Hematological: Negative.    Psychiatric/Behavioral: Negative.        I have reviewed the ROS documented by my clinical staff, I have updated appropriately and I agree. Jim Mcfarland MD    Past Medical History:   Past Medical History:   Diagnosis Date    Bell's palsy     Diverticulosis     AIRAM (generalized anxiety disorder)     GERD (gastroesophageal reflux disease)     Hiatal hernia     Tobacco dependence        Past Surgical History:   Past Surgical History:   Procedure Laterality Date    COLONOSCOPY  2016    HAND SURGERY Right 2021    right trigger thumb release    HIATAL HERNIA REPAIR  2004    INGUINAL HERNIA REPAIR Bilateral 1966    NEUROPLASTY / TRANSPOSITION ULNAR NERVE AT ELBOW Left     NISSEN FUNDOPLICATION      UPPER GASTROINTESTINAL ENDOSCOPY         Family History:   Family History   Problem Relation Age of Onset    Diabetes Mother     Lung cancer Father      Diabetes Brother     Diabetes Maternal Grandmother     Heart disease Maternal Grandmother     No Known Problems Maternal Grandfather     No Known Problems Paternal Grandmother     No Known Problems Paternal Grandfather     Heart disease Maternal Aunt     No Known Problems Brother        Social History:   Social History     Socioeconomic History    Marital status:      Spouse name: Cesar    Number of children: 0    Years of education: H.S.   Tobacco Use    Smoking status: Every Day     Current packs/day: 1.00     Average packs/day: 1 pack/day for 22.0 years (22.0 ttl pk-yrs)     Types: Cigarettes     Passive exposure: Current    Smokeless tobacco: Never   Vaping Use    Vaping status: Never Used   Substance and Sexual Activity    Alcohol use: Yes     Comment: rarely    Drug use: No    Sexual activity: Not Currently     Partners: Female     Birth control/protection: Post-menopausal     Comment: wife only       Medications:     Current Outpatient Medications:     tamsulosin (FLOMAX) 0.4 MG capsule 24 hr capsule, Take 1 capsule by mouth Daily for 360 days., Disp: 90 capsule, Rfl: 3    Allergies:   No Known Allergies    IPSS Questionnaire (AUA-7):  Over the past month…    1)  Incomplete Emptying  How often have you had a sensation of not emptying your bladder?  1 - Less than 1 time in 5   2)  Frequency  How often have you had to urinate less than every two hours? 1 - Less than 1 time in 5   3)  Intermittency  How often have you found you stopped and started again several times when you urinated?  1 - Less than 1 time in 5   4) Urgency  How often have you found it difficult to postpone urination?  2 - Less than half the time   5) Weak Stream  How often have you had a weak urinary stream?  4 - More than half the time   6) Straining  How often have you had to push or strain to begin urination?  1 - Less than 1 time in 5   7) Nocturia  How many times did you typically get up at night to urinate?  0 - None   Total  "Score:  10   The International Prostate Symptom Score (IPSS) is used to screen, diagnose, track symptoms of benign prostatic hyperplasia (BPH).    0-7 pts (Mild Symptoms)  / 8-19 pts (Moderate) / 20-35 (Severe)    Quality of life due to urinary symptoms:  If you were to spend the rest of your life with your urinary condition the way it is now, how would you feel about that? 3-Mixed   Urine Leakage (Incontinence) 0-No Leakage       Objective     Physical Exam:   Vital Signs:   Vitals:    04/24/24 1444   BP: 142/88   Pulse: 78   SpO2: 97%   Weight: 79.4 kg (175 lb)   Height: 175.3 cm (69.02\")   PainSc: 0-No pain     Body mass index is 25.83 kg/m².     Physical Exam  Vitals and nursing note reviewed.   Constitutional:       General: He is awake. He is not in acute distress.     Appearance: Normal appearance. He is well-developed.   HENT:      Head: Normocephalic and atraumatic.      Right Ear: External ear normal.      Left Ear: External ear normal.      Nose: Nose normal.   Eyes:      Conjunctiva/sclera: Conjunctivae normal.   Pulmonary:      Effort: Pulmonary effort is normal.   Abdominal:      General: There is no distension.      Palpations: Abdomen is soft. There is no mass.      Tenderness: There is no abdominal tenderness. There is no right CVA tenderness, left CVA tenderness, guarding or rebound.      Hernia: No hernia is present. There is no hernia in the left inguinal area or right inguinal area.   Genitourinary:     Pubic Area: No rash.       Penis: Normal.       Testes: Normal.      Prostate: Normal.      Rectum: Normal. No mass or tenderness. Normal anal tone.   Musculoskeletal:      Cervical back: Normal range of motion.   Lymphadenopathy:      Lower Body: No right inguinal adenopathy. No left inguinal adenopathy.   Skin:     General: Skin is warm.   Neurological:      General: No focal deficit present.      Mental Status: He is alert and oriented to person, place, and time.   Psychiatric:         " Behavior: Behavior normal. Behavior is cooperative.         Labs:   Brief Urine Lab Results  (Last result in the past 365 days)        Color   Clarity   Blood   Leuk Est   Nitrite   Protein   CREAT   Urine HCG        02/22/24 1422 Yellow   Clear   Negative   Negative   Negative   Negative                   Urine Culture          2/22/2024    14:22   Urine Culture   Urine Culture No growth         Lab Results   Component Value Date    GLUCOSE 82 12/08/2020    CALCIUM 9.6 12/08/2020     12/08/2020    K 4.1 12/08/2020    CO2 28.5 12/08/2020     12/08/2020    BUN 11 12/08/2020    CREATININE 0.79 12/08/2020    EGFRIFNONA 102 12/08/2020    BCR 13.9 12/08/2020    ANIONGAP 9.5 12/08/2020       Lab Results   Component Value Date    WBC 6.82 12/08/2020    HGB 16.1 12/08/2020    HCT 48.9 12/08/2020    MCV 92.1 12/08/2020     12/08/2020       Lab Results   Component Value Date    PSA 0.906 12/08/2020    PSA 1.170 10/02/2017    PSA 0.40 04/20/2016        Images:   US Scrotum & Testicles    Result Date: 4/24/2024  Impression: 1. No significant sonographic abnormality of the testicles. 2. An elongated cystic structure within the right scrotum appears external to the epididymis and may represent an encysted hydrocele or other benign extratesticular cystic lesion. 3. Mildly prominent/heterogeneous appearance of the left epididymis without increased vascularity to suggest epididymitis. 4. Bilateral varicoceles. Electronically Signed: Balbina Jewell MD  4/24/2024 11:54 AM EDT  Workstation ID: [id number]      Measures:   Tobacco:   Gerhard Valencia  reports that he has been smoking cigarettes. He has a 22 pack-year smoking history. He has been exposed to tobacco smoke. He has never used smokeless tobacco. I have educated him on the risk of diseases from using tobacco products such as cancer, COPD, and heart disease.     I advised him to quit     I spent 3  minutes counseling the patient.    Urine Incontinence: Patient  reports that he is not currently experiencing any symptoms of urinary incontinence.       Assessment / Plan      Assessment/Plan:   Gerhard Valencia is a 58 y.o. male who presented today for right sided testicular pain.  He reports his pain has resolved.  I reviewed his ultrasound which did not show anything concerning.  I have advised him to start Vit B12.  He states when he does not squeeze his epididymal cyst his testicle does not hurt.  I advised him to stop squeezing it.  I will start him on tamsulosin for his LUTS.  I discussed possible risks and side effects.  I will see him back in 3 months.  We discussed outlet obstruction and work up.  He will think about what he wants to do.        Diagnoses and all orders for this visit:    1. Benign localized prostatic hyperplasia with lower urinary tract symptoms (LUTS) (Primary)  -     tamsulosin (FLOMAX) 0.4 MG capsule 24 hr capsule; Take 1 capsule by mouth Daily for 360 days.  Dispense: 90 capsule; Refill: 3        Patient Education:        Follow Up:   Return in about 3 months (around 7/24/2024) for Recheck.        Jim Mcfarland MD  [facility] [place]

## 2024-04-25 ENCOUNTER — TELEPHONE (OUTPATIENT)
Dept: FAMILY MEDICINE CLINIC | Facility: CLINIC | Age: 59
End: 2024-04-25
Payer: COMMERCIAL

## 2024-04-25 NOTE — TELEPHONE ENCOUNTER
Called and spoke with patient and he has followed up with Urologist. Patient does not have any further questions and he will follow up with urology again in 3 months.

## 2024-04-25 NOTE — TELEPHONE ENCOUNTER
Name: ValenciaGerhard EFRA      Relationship: Self      Best Callback Number: [phone]       HUB PROVIDED THE RELAY MESSAGE FROM THE OFFICE      PATIENT: HAS FURTHER QUESTIONS AND WOULD LIKE A CALL BACK AT THE FOLLOWING PHONE NUMBER [phone]    ADDITIONAL INFORMATION:

## 2024-04-25 NOTE — TELEPHONE ENCOUNTER
"Hub relay: \"Please let patient know that the ultrasound of his scrotum and testicles showed no significant sonographic abnormality in the testicles.  There was an elongated cystic structure within the right scrotum that appears external to the epididymis and may represent a hydrocele or other benign extratesticular cystic lesion.  Mild prominent heterogenous appearance of left epididymis without increased vascularity to suggest epididymitis.  Bilateral varicoceles were also present.  I would recommend that he discuss this at his urology appointment. \" Lm for pt to call us back.  "

## 2024-05-13 RX ORDER — SODIUM, POTASSIUM,MAG SULFATES 17.5-3.13G
SOLUTION, RECONSTITUTED, ORAL ORAL
Qty: 354 ML | Refills: 0 | Status: SHIPPED | OUTPATIENT
Start: 2024-05-13

## 2024-07-08 RX ORDER — SODIUM, POTASSIUM,MAG SULFATES 17.5-3.13G
2 SOLUTION, RECONSTITUTED, ORAL ORAL TAKE AS DIRECTED
Qty: 354 ML | Refills: 0 | Status: SHIPPED | OUTPATIENT
Start: 2024-07-08

## 2024-08-14 RX ORDER — SODIUM, POTASSIUM,MAG SULFATES 17.5-3.13G
SOLUTION, RECONSTITUTED, ORAL ORAL
Qty: 354 ML | Refills: 0 | Status: SHIPPED | OUTPATIENT
Start: 2024-08-14

## 2024-08-23 ENCOUNTER — OUTSIDE FACILITY SERVICE (OUTPATIENT)
Dept: GASTROENTEROLOGY | Facility: CLINIC | Age: 59
End: 2024-08-23
Payer: COMMERCIAL

## 2024-08-23 PROCEDURE — 45378 DIAGNOSTIC COLONOSCOPY: CPT | Performed by: INTERNAL MEDICINE

## 2024-10-25 ENCOUNTER — OFFICE VISIT (OUTPATIENT)
Dept: UROLOGY | Facility: CLINIC | Age: 59
End: 2024-10-25
Payer: COMMERCIAL

## 2024-10-25 VITALS — HEIGHT: 69 IN | BODY MASS INDEX: 25.83 KG/M2

## 2024-10-25 DIAGNOSIS — N40.1 BENIGN LOCALIZED PROSTATIC HYPERPLASIA WITH LOWER URINARY TRACT SYMPTOMS (LUTS): Primary | ICD-10-CM

## 2024-10-25 NOTE — PROGRESS NOTES
LUTS Male Office Visit      Patient Name: Gerhard Valencia  : 1965   MRN: [id number]     Chief Complaint:  Lower Urinary Tract Symptoms.   Chief Complaint   Patient presents with    Benign localized prostatic hyperplasia with lower urinary t        Referring Provider: No ref. provider found    History of Present Illness: Mr. Valencia is a 59 y.o. male with history of lower urinary tract symptoms.  He reports he has done well since his last visit.  He reports that he is not having pain any longer.  He reports the tamsulosin has helped him.  No dysuria or gross hematuria.          Subjective      Review of System:   Review of Systems   Constitutional:  Negative for chills, fatigue, fever and unexpected weight change.   HENT:  Negative for congestion, rhinorrhea and sore throat.    Eyes:  Negative for visual disturbance.   Respiratory:  Negative for apnea, cough, chest tightness and shortness of breath.    Cardiovascular:  Negative for chest pain.   Gastrointestinal:  Negative for abdominal pain, constipation, diarrhea, nausea and vomiting.   Endocrine: Negative for polydipsia and polyuria.   Genitourinary:  Negative for difficulty urinating, dysuria, enuresis, flank pain, frequency, genital sores, hematuria and urgency.   Musculoskeletal:  Negative for gait problem.   Skin:  Negative for rash and wound.   Allergic/Immunologic: Negative for immunocompromised state.   Neurological:  Negative for dizziness, tremors, syncope, weakness and light-headedness.   Hematological:  Does not bruise/bleed easily.      I have reviewed the ROS documented by my clinical staff, I have updated appropriately and I agree. Jim Mcfarland MD    Past Medical History:  Past Medical History:   Diagnosis Date    Bell's palsy     Diverticulosis     AIRAM (generalized anxiety disorder)     GERD (gastroesophageal reflux disease)     Hiatal hernia     Tobacco dependence        Past Surgical History:  Past Surgical History:   Procedure  Laterality Date    COLONOSCOPY  2016    HAND SURGERY Right 01/08/2021    right trigger thumb release    HIATAL HERNIA REPAIR  2004    INGUINAL HERNIA REPAIR Bilateral 1966    NEUROPLASTY / TRANSPOSITION ULNAR NERVE AT ELBOW Left 2012    NISSEN FUNDOPLICATION  1999    UPPER GASTROINTESTINAL ENDOSCOPY  2016       Medications:    Current Outpatient Medications:     sodium-potassium-magnesium sulfates (Suprep Bowel Prep Kit) 17.5-3.13-1.6 GM/177ML solution oral solution, Take First Dose at 5 pm Take Second Dose at 10 pm, Nothing to eat or drink after midnight. May substitute for Golytely or Moviprep. Follow instructions provided by Office. Call [phone] with questions., Disp: 354 mL, Rfl: 0    tamsulosin (FLOMAX) 0.4 MG capsule 24 hr capsule, Take 1 capsule by mouth Daily for 360 days., Disp: 90 capsule, Rfl: 3    Allergies:  No Known Allergies    Social History:  Social History     Socioeconomic History    Marital status:      Spouse name: Cesar    Number of children: 0    Years of education: H.S.   Tobacco Use    Smoking status: Every Day     Current packs/day: 1.00     Average packs/day: 1 pack/day for 22.0 years (22.0 ttl pk-yrs)     Types: Cigarettes     Passive exposure: Current    Smokeless tobacco: Never   Vaping Use    Vaping status: Never Used   Substance and Sexual Activity    Alcohol use: Yes     Comment: rarely    Drug use: No    Sexual activity: Not Currently     Partners: Female     Birth control/protection: Post-menopausal     Comment: wife only       Family History:  Family History   Problem Relation Age of Onset    Diabetes Mother     Lung cancer Father     Diabetes Brother     Diabetes Maternal Grandmother     Heart disease Maternal Grandmother     No Known Problems Maternal Grandfather     No Known Problems Paternal Grandmother     No Known Problems Paternal Grandfather     Heart disease Maternal Aunt     No Known Problems Brother        IPSS Questionnaire (AUA-7):  Over the past month…   "  1)  Incomplete Emptying  How often have you had a sensation of not emptying your bladder?  3 - About half the time   2)  Frequency  How often have you had to urinate less than every two hours? 4 - More than half the time   3)  Intermittency  How often have you found you stopped and started again several times when you urinated?  3 - About half the time   4) Urgency  How often have you found it difficult to postpone urination?  1 - Less than 1 time in 5   5) Weak Stream  How often have you had a weak urinary stream?  3 - About half the time   6) Straining  How often have you had to push or strain to begin urination?  1 - Less than 1 time in 5   7) Nocturia  How many times did you typically get up at night to urinate?  1 - 1 time   Total Score:  16   The International Prostate Symptom Score (IPSS) is used to screen, diagnose, track symptoms of benign prostatic hyperplasia (BPH).    0-7 pts (Mild Symptoms)  / 8-19 pts (Moderate) / 20-35 (Severe)    Quality of life due to urinary symptoms:  If you were to spend the rest of your life with your urinary condition the way it is now, how would you feel about that? 3-Mixed   Urine Leakage (Incontinence) 0-No Leakage       Objective     Physical Exam:   Vital Signs:   Vitals:    10/25/24 1057   Height: 175.3 cm (69.02\")     Body mass index is 25.83 kg/m².     Physical Exam  Vitals and nursing note reviewed.   Constitutional:       General: He is awake. He is not in acute distress.     Appearance: Normal appearance. He is well-developed.   HENT:      Head: Normocephalic and atraumatic.      Right Ear: External ear normal.      Left Ear: External ear normal.      Nose: Nose normal.   Eyes:      Conjunctiva/sclera: Conjunctivae normal.   Pulmonary:      Effort: Pulmonary effort is normal.   Abdominal:      General: There is no distension.      Palpations: Abdomen is soft. There is no mass.      Tenderness: There is no abdominal tenderness. There is no right CVA tenderness, left " CVA tenderness, guarding or rebound.      Hernia: No hernia is present. There is no hernia in the left inguinal area or right inguinal area.   Genitourinary:     Pubic Area: No rash.       Rectum: No mass or tenderness. Normal anal tone.   Musculoskeletal:      Cervical back: Normal range of motion.   Lymphadenopathy:      Lower Body: No right inguinal adenopathy. No left inguinal adenopathy.   Skin:     General: Skin is warm.   Neurological:      General: No focal deficit present.      Mental Status: He is alert and oriented to person, place, and time.   Psychiatric:         Behavior: Behavior normal. Behavior is cooperative.         Labs:   Lab Results   Component Value Date    PSA 0.906 12/08/2020    PSA 1.170 10/02/2017    PSA 0.40 04/20/2016       Brief Urine Lab Results  (Last result in the past 365 days)        Color   Clarity   Blood   Leuk Est   Nitrite   Protein   CREAT   Urine HCG        02/22/24 1422 Yellow   Clear   Negative   Negative   Negative   Negative                   Urine Culture          2/22/2024    14:22   Urine Culture   Urine Culture No growth         Lab Results   Component Value Date    GLUCOSE 82 12/08/2020    CALCIUM 9.6 12/08/2020     12/08/2020    K 4.1 12/08/2020    CO2 28.5 12/08/2020     12/08/2020    BUN 11 12/08/2020    CREATININE 0.79 12/08/2020    EGFRIFNONA 102 12/08/2020    BCR 13.9 12/08/2020    ANIONGAP 9.5 12/08/2020       Lab Results   Component Value Date    WBC 6.82 12/08/2020    HGB 16.1 12/08/2020    HCT 48.9 12/08/2020    MCV 92.1 12/08/2020     12/08/2020       Images:   No Images in the past 120 days found..    Measures:   Tobacco:   Gerhard Valencia  reports that he has been smoking cigarettes. He has a 22 pack-year smoking history. He has been exposed to tobacco smoke. He has never used smokeless tobacco. I have educated him on the risk of diseases from using tobacco products such as cancer, COPD, and heart disease.     I advised him to quit      I spent 3  minutes counseling the patient.           Urine Incontinence: Patient reports that he is not currently experiencing any symptoms of urinary incontinence.       Assessment / Plan      Assessment:  Mr. Valencia is a 59 y.o. male who presented today with lower urinary tract symptoms.  He reports he is doing really well.  We discussed an outlet work up and he will think about it.  He has not obtained his labs including PSA which were ordered by his PCP.  I advised him to get his blood work done at his earliest convenience.  I will see him back in 6 months.      There are no diagnoses linked to this encounter.    Patient Education:       Follow Up:   Return in about 6 months (around 4/25/2025) for Recheck.    I spent approximately 20 minutes providing clinical care for this patient; including review of patient's chart and provider documentation, face to face time spent with patient in examination room (obtaining history, performing physical exam, discussing diagnosis and management options), placing orders, and completing patient documentation.     Jim Mcfarland MD  [facility] [place]

## 2025-04-25 ENCOUNTER — OFFICE VISIT (OUTPATIENT)
Dept: UROLOGY | Facility: CLINIC | Age: 60
End: 2025-04-25
Payer: COMMERCIAL

## 2025-04-25 VITALS — WEIGHT: 170 LBS | HEIGHT: 68 IN | BODY MASS INDEX: 25.76 KG/M2

## 2025-04-25 DIAGNOSIS — N40.1 BENIGN LOCALIZED PROSTATIC HYPERPLASIA WITH LOWER URINARY TRACT SYMPTOMS (LUTS): Primary | ICD-10-CM

## 2025-04-25 PROCEDURE — 99214 OFFICE O/P EST MOD 30 MIN: CPT | Performed by: UROLOGY

## 2025-04-25 RX ORDER — TAMSULOSIN HYDROCHLORIDE 0.4 MG/1
1 CAPSULE ORAL DAILY
Qty: 90 CAPSULE | Refills: 3 | Status: SHIPPED | OUTPATIENT
Start: 2025-04-25 | End: 2026-04-20

## 2025-04-25 NOTE — PROGRESS NOTES
Follow Up Office Visit      Patient Name: Gerhard Valencia  : 1965   MRN: [id number]     Chief Complaint:    Chief Complaint   Patient presents with    Benign Prostatic Hypertrophy       Referring Provider: No ref. provider found    History of Present Illness: Gerhard Valencia is a 59 y.o. male who presents today for follow up of LUTS.  He ran out of tamsulosin and feels like it made his urinary symptoms worse.  He would like a refill.  No dysuria or gross hematuria.      Subjective      Review of System:   Review of Systems   I have reviewed the ROS documented by my clinical staff, I have updated appropriately and I agree. Jim Mcfarland MD    I have reviewed and the following portions of the patient's history were updated as appropriate: past family history, past medical history, past social history, past surgical history and problem list.    Past Medical History:   Past Medical History:   Diagnosis Date    Bell's palsy     Diverticulosis     AIRAM (generalized anxiety disorder)     GERD (gastroesophageal reflux disease)     Hiatal hernia     Tobacco dependence        Past Surgical History:  Past Surgical History:   Procedure Laterality Date    COLONOSCOPY  2016    HAND SURGERY Right 2021    right trigger thumb release    HIATAL HERNIA REPAIR  2004    INGUINAL HERNIA REPAIR Bilateral 1966    NEUROPLASTY / TRANSPOSITION ULNAR NERVE AT ELBOW Left     NISSEN FUNDOPLICATION      UPPER GASTROINTESTINAL ENDOSCOPY         Family History:   family history includes Diabetes in his brother, maternal grandmother, and mother; Heart disease in his maternal aunt and maternal grandmother; Lung cancer in his father; No Known Problems in his brother, maternal grandfather, paternal grandfather, and paternal grandmother.   Otherwise pertinent FHx was reviewed and not pertinent to current issue.    Social History:    reports that he has been smoking cigarettes. He has a 22 pack-year smoking history. He has  "been exposed to tobacco smoke. He has never used smokeless tobacco. He reports current alcohol use. He reports that he does not use drugs.    Medications:     Current Outpatient Medications:     albuterol sulfate  (90 Base) MCG/ACT inhaler, Inhale 2 puffs Every 4 (Four) Hours As Needed for Wheezing or Shortness of Air. (Patient not taking: Reported on 4/25/2025), Disp: 6.7 g, Rfl: 0    predniSONE (DELTASONE) 20 MG tablet, Take 2 tablets by mouth Daily. (Patient not taking: Reported on 4/25/2025), Disp: 10 tablet, Rfl: 0    Allergies:   No Known Allergies    IPSS Questionnaire (AUA-7):  Over the past month…    1)  Incomplete Emptying  How often have you had a sensation of not emptying your bladder?  2 - Less than half the time   2)  Frequency  How often have you had to urinate less than every two hours? 4 - More than half the time   3)  Intermittency  How often have you found you stopped and started again several times when you urinated?  2 - Less than half the time   4) Urgency  How often have you found it difficult to postpone urination?  4 - More than half the time   5) Weak Stream  How often have you had a weak urinary stream?  4 - More than half the time   6) Straining  How often have you had to push or strain to begin urination?  2 - Less than half the time   7) Nocturia  How many times did you typically get up at night to urinate?  0 - None   Total Score:  18   The International Prostate Symptom Score (IPSS) is used to screen, diagnose, track symptoms of benign prostatic hyperplasia (BPH).    0-7 pts (Mild Symptoms)  / 8-19 pts (Moderate) / 20-35 (Severe)    Quality of life due to urinary symptoms:  If you were to spend the rest of your life with your urinary condition the way it is now, how would you feel about that? 3-Mixed   Urine Leakage (Incontinence) 0-No Leakage       Objective     Physical Exam:   Vital Signs:   Vitals:    04/25/25 1045   Weight: 77.1 kg (170 lb)   Height: 172.7 cm (68\") "   PainSc: 0-No pain     Body mass index is 25.85 kg/m².     Physical Exam  Vitals and nursing note reviewed.   Constitutional:       General: He is awake. He is not in acute distress.     Appearance: Normal appearance. He is well-developed.   HENT:      Head: Normocephalic and atraumatic.      Right Ear: External ear normal.      Left Ear: External ear normal.      Nose: Nose normal.   Eyes:      Conjunctiva/sclera: Conjunctivae normal.   Pulmonary:      Effort: Pulmonary effort is normal.   Abdominal:      General: There is no distension.      Palpations: Abdomen is soft. There is no mass.      Tenderness: There is no abdominal tenderness. There is no right CVA tenderness, left CVA tenderness, guarding or rebound.      Hernia: No hernia is present. There is no hernia in the left inguinal area or right inguinal area.   Genitourinary:     Pubic Area: No rash.       Rectum: No mass or tenderness. Normal anal tone.   Musculoskeletal:      Cervical back: Normal range of motion.   Lymphadenopathy:      Lower Body: No right inguinal adenopathy. No left inguinal adenopathy.   Skin:     General: Skin is warm.   Neurological:      General: No focal deficit present.      Mental Status: He is alert and oriented to person, place, and time.   Psychiatric:         Behavior: Behavior normal. Behavior is cooperative.         Labs:   Brief Urine Lab Results       None                 Lab Results   Component Value Date    GLUCOSE 82 12/08/2020    CALCIUM 9.6 12/08/2020     12/08/2020    K 4.1 12/08/2020    CO2 28.5 12/08/2020     12/08/2020    BUN 11 12/08/2020    CREATININE 0.79 12/08/2020    EGFRIFNONA 102 12/08/2020    BCR 13.9 12/08/2020    ANIONGAP 9.5 12/08/2020       Lab Results   Component Value Date    WBC 6.82 12/08/2020    HGB 16.1 12/08/2020    HCT 48.9 12/08/2020    MCV 92.1 12/08/2020     12/08/2020       Lab Results   Component Value Date    PSA 0.906 12/08/2020    PSA 1.170 10/02/2017    PSA 0.40  04/20/2016       Images:   No Images in the past 120 days found..    Measures:   Tobacco:   Gerhard Valencia  reports that he has been smoking cigarettes. He has a 22 pack-year smoking history. He has been exposed to tobacco smoke. He has never used smokeless tobacco.       Urine Incontinence: Patient reports that he is not currently experiencing any symptoms of urinary incontinence.      Assessment / Plan      Assessment/Plan:   59 y.o. male who presented today for follow up of LUTS.  He has done well on tamsulosin.  He reports his PCP does PSA for him and they have been normal.  However, I do not see lab work in our system.  I will obtain a PSA for him.   I will see him back in 6 months.  His tamsulosin has been refilled.     Diagnoses and all orders for this visit:    1. Benign localized prostatic hyperplasia with lower urinary tract symptoms (LUTS) (Primary)         Follow Up:   Return in about 3 months (around 7/25/2025) for Recheck.    I spent approximately 30 minutes providing clinical care for this patient; including review of patient's chart and provider documentation, face to face time spent with patient in examination room (obtaining history, performing physical exam, discussing diagnosis and management options), placing orders, and completing patient documentation.     Jim Mcfarland MD  [facility]